# Patient Record
Sex: FEMALE | Race: BLACK OR AFRICAN AMERICAN
[De-identification: names, ages, dates, MRNs, and addresses within clinical notes are randomized per-mention and may not be internally consistent; named-entity substitution may affect disease eponyms.]

---

## 2019-03-12 ENCOUNTER — APPOINTMENT (OUTPATIENT)
Dept: GASTROENTEROLOGY | Facility: CLINIC | Age: 29
End: 2019-03-12
Payer: MEDICAID

## 2019-03-12 VITALS
BODY MASS INDEX: 25.99 KG/M2 | HEIGHT: 65 IN | DIASTOLIC BLOOD PRESSURE: 70 MMHG | SYSTOLIC BLOOD PRESSURE: 120 MMHG | WEIGHT: 156 LBS | HEART RATE: 78 BPM

## 2019-03-12 DIAGNOSIS — Z80.0 FAMILY HISTORY OF MALIGNANT NEOPLASM OF DIGESTIVE ORGANS: ICD-10-CM

## 2019-03-12 DIAGNOSIS — M47.816 SPONDYLOSIS W/OUT MYELOPATHY OR RADICULOPATHY, LUMBAR REGION: ICD-10-CM

## 2019-03-12 DIAGNOSIS — Z78.9 OTHER SPECIFIED HEALTH STATUS: ICD-10-CM

## 2019-03-12 DIAGNOSIS — Z82.49 FAMILY HISTORY OF ISCHEMIC HEART DISEASE AND OTHER DISEASES OF THE CIRCULATORY SYSTEM: ICD-10-CM

## 2019-03-12 PROCEDURE — 99202 OFFICE O/P NEW SF 15 MIN: CPT

## 2019-03-12 PROCEDURE — 82272 OCCULT BLD FECES 1-3 TESTS: CPT

## 2019-03-12 NOTE — HISTORY OF PRESENT ILLNESS
[Heartburn] : denies heartburn [Nausea] : denies nausea [Vomiting] : denies vomiting [Diarrhea] : denies diarrhea [Constipation] : denies constipation [Yellow Skin Or Eyes (Jaundice)] : denies jaundice [Abdominal Pain] : denies abdominal pain [Abdominal Swelling] : denies abdominal swelling [Rectal Pain] : denies rectal pain [None] : had no significant interval events [Wt Gain ___ Lbs] : no recent weight gain [Wt Loss ___ Lbs] : no recent weight loss [GERD] : no gastroesophageal reflux disease [Hiatus Hernia] : no hiatus hernia [Peptic Ulcer Disease] : no peptic ulcer disease [Pancreatitis] : no pancreatitis [Cholelithiasis] : no cholelithiasis [Kidney Stone] : no kidney stone [Inflammatory Bowel Disease] : no inflammatory bowel disease [Irritable Bowel Syndrome] : no irritable bowel syndrome [Diverticulitis] : no diverticulitis [Alcohol Abuse] : no alcohol abuse [Malignancy] : no malignancy [Abdominal Surgery] : no abdominal surgery [Appendectomy] : no appendectomy [Cholecystectomy] : no cholecystectomy [de-identified] : this is the patient's first visit here [de-identified] : Patient presents today for initial evaluation for screening colonoscopy with a very strong family history of colon cancer in first-degree relatives. Her brother was diagnosed at age 32 with colon cancer and is presently live in her father was diagnosed at age 62 and recently  from metastatic colon cancer. She also has second-degree relatives such as aunts and uncles who have had colon cancer. The patient has no lower GI symptoms or complaints and has had no previous colonoscopies.

## 2019-03-12 NOTE — PHYSICAL EXAM
[General Appearance - Alert] : alert [General Appearance - In No Acute Distress] : in no acute distress [Sclera] : the sclera and conjunctiva were normal [PERRL With Normal Accommodation] : pupils were equal in size, round, and reactive to light [Extraocular Movements] : extraocular movements were intact [Outer Ear] : the ears and nose were normal in appearance [Examination Of The Oral Cavity] : the lips and gums were normal [Oropharynx] : the oropharynx was normal [Neck Appearance] : the appearance of the neck was normal [Neck Cervical Mass (___cm)] : no neck mass was observed [Jugular Venous Distention Increased] : there was no jugular-venous distention [Thyroid Diffuse Enlargement] : the thyroid was not enlarged [Thyroid Nodule] : there were no palpable thyroid nodules [Auscultation Breath Sounds / Voice Sounds] : lungs were clear to auscultation bilaterally [Heart Rate And Rhythm] : heart rate was normal and rhythm regular [Heart Sounds] : normal S1 and S2 [Heart Sounds Gallop] : no gallops [Murmurs] : no murmurs [Heart Sounds Pericardial Friction Rub] : no pericardial rub [Bowel Sounds] : normal bowel sounds [Abdomen Soft] : soft [Abdomen Tenderness] : non-tender [] : no hepato-splenomegaly [Abdomen Mass (___ Cm)] : no abdominal mass palpated [Normal Sphincter Tone] : normal sphincter tone [No Rectal Mass] : no rectal mass [No CVA Tenderness] : no ~M costovertebral angle tenderness [Abnormal Walk] : normal gait [Skin Color & Pigmentation] : normal skin color and pigmentation [Skin Turgor] : normal skin turgor [Oriented To Time, Place, And Person] : oriented to person, place, and time [Internal Hemorrhoid] : no internal hemorrhoids [External Hemorrhoid] : no external hemorrhoids [Occult Blood Positive] : stool was negative for occult blood [FreeTextEntry1] : hemoccult negative stool. The exam was chaperoned.

## 2019-03-12 NOTE — ASSESSMENT
[FreeTextEntry1] : Impression: Family history of colon cancer with 2 first-degree relatives with colon cancer including her brother who was diagnosed with colon cancer at age 32. Patient has had no previous screening colonoscopies.\par \par Recommendations: Screening colonoscopy was advised because of the above strong FH of colon cancer. The risks versus benefits of screening colonoscopy, the one in 1000 risk of possible colonic perforation, the risk of possible post biopsy of polypectomy bleeding, the risk of possible respiratory suppression with intravenous sedation, and alternative testing such as virtual colonoscopy, were individually explained to the patient today who appeared to understand all of the above and was agreeable to proceeding with screening colonoscopy. Her ASA classification is 1. She will be prepped with MiraLax and Dulcolax tablets and is medically optimized for the proposed colonoscopy. She appeared to understand all of the above instructions and management plan.

## 2019-05-10 ENCOUNTER — APPOINTMENT (OUTPATIENT)
Dept: GASTROENTEROLOGY | Facility: GI CENTER | Age: 29
End: 2019-05-10
Payer: MEDICAID

## 2019-05-10 ENCOUNTER — OUTPATIENT (OUTPATIENT)
Dept: OUTPATIENT SERVICES | Facility: HOSPITAL | Age: 29
LOS: 1 days | End: 2019-05-10
Payer: MEDICAID

## 2019-05-10 DIAGNOSIS — Z12.11 ENCOUNTER FOR SCREENING FOR MALIGNANT NEOPLASM OF COLON: ICD-10-CM

## 2019-05-10 DIAGNOSIS — Z80.9 FAMILY HISTORY OF MALIGNANT NEOPLASM, UNSPECIFIED: ICD-10-CM

## 2019-05-10 PROCEDURE — G0105: CPT

## 2019-05-10 PROCEDURE — 45378 DIAGNOSTIC COLONOSCOPY: CPT

## 2019-05-10 NOTE — PHYSICAL EXAM
[General Appearance - Alert] : alert [Sclera] : the sclera and conjunctiva were normal [General Appearance - In No Acute Distress] : in no acute distress [PERRL With Normal Accommodation] : pupils were equal in size, round, and reactive to light [Extraocular Movements] : extraocular movements were intact [Outer Ear] : the ears and nose were normal in appearance [Examination Of The Oral Cavity] : the lips and gums were normal [Oropharynx] : the oropharynx was normal [Neck Appearance] : the appearance of the neck was normal [Thyroid Diffuse Enlargement] : the thyroid was not enlarged [Jugular Venous Distention Increased] : there was no jugular-venous distention [Neck Cervical Mass (___cm)] : no neck mass was observed [Auscultation Breath Sounds / Voice Sounds] : lungs were clear to auscultation bilaterally [Thyroid Nodule] : there were no palpable thyroid nodules [Heart Rate And Rhythm] : heart rate was normal and rhythm regular [Heart Sounds Gallop] : no gallops [Heart Sounds] : normal S1 and S2 [Bowel Sounds] : normal bowel sounds [Murmurs] : no murmurs [Heart Sounds Pericardial Friction Rub] : no pericardial rub [] : no hepato-splenomegaly [Abdomen Soft] : soft [Abdomen Tenderness] : non-tender [Abdomen Mass (___ Cm)] : no abdominal mass palpated [No Rectal Mass] : no rectal mass [Normal Sphincter Tone] : normal sphincter tone [No CVA Tenderness] : no ~M costovertebral angle tenderness [Skin Color & Pigmentation] : normal skin color and pigmentation [Abnormal Walk] : normal gait [Skin Turgor] : normal skin turgor [Oriented To Time, Place, And Person] : oriented to person, place, and time [Occult Blood Positive] : stool was negative for occult blood [Internal Hemorrhoid] : no internal hemorrhoids [External Hemorrhoid] : no external hemorrhoids [FreeTextEntry1] : hemoccult negative stool. The exam was chaperoned.

## 2019-05-10 NOTE — PROCEDURE
[Fm Hx of Colon Ca/Polyps] : family history of colon cancer and/or polyps [Procedure Explained] : The procedure was explained [Allergies Reviewed] : allergies reviewed. [Alternatives] : alternatives [Risks] : Risks [Benefits] : benefits [Bleeding] : bleeding risk [Infection] : risk of infection [Consent Obtained] : written consent was obtained prior to the procedure and is detailed in the patient's record [Approved Diet Followed] : the patient avoided solid foods and adhered to the approved diet list for 24 hours prior to the procedure [Patient] : the patient [Bowel Prep Kit] : the patient took the appropriate bowel preparation kit as directed [Cardiac Monitor] : cardiac monitor [Automated Blood Pressure Cuff] : automated blood pressure cuff [Pulse Oximeter] : pulse oximeter [Propofol ___ mg IV] : Propofol [unfilled] ~Umg intravenously [___ L/min Oxygen via NC] : [unfilled] ~Uliters/minute oxygen via nasal cannula [1] : 1 [Time started: ___] : Start Time:  [unfilled] [Sedation Clearance] : the patient was cleared for moderate sedation [Prep Qualtiy: ___] : Prep Quality:  [unfilled] [Time Completed: ___] : Completion Time:  [unfilled] [Withdrawal Time: ___] : Withdrawal Time:  [unfilled] [Left Lateral Decubitus] : The patient was positioned in the left lateral decubitus position [Performed By: ___] : Performed by:  ODESSA [Cecum (Landmarks/Transillum)] : and guided to the cecum which was identified by the anatomic landmarks of the appendiceal orifice and ileocecal valve and by transillumination in the right lower quadrant [No Difficulty] : without difficulty [Single Pass Needed] : after a single pass [Insufflated] : insufflated [Retroflex View] : a retroflex view of the rectum was performed [Normal] : Normal [Hemorrhoids] : hemorrhoids [Tolerated Well] : the patient tolerated the procedure well [Vital Signs Stable] : the vital signs were stable [No Complications] : There were no complications [Abnormal Rectum] : a normal rectum [Patient Rotated Into Alternating Positions] : the patient was not rotated [External Hemorrhoids] : no external hemorrhoids [de-identified] : [facility] [address] [place] [id number] [de-identified] : Internal hemorrhoids noted on retroflexion. [de-identified] : Internal hemorrhoids o/w normal colonoscopy to the cecum.

## 2019-05-10 NOTE — PHYSICAL EXAM
[General Appearance - In No Acute Distress] : in no acute distress [Sclera] : the sclera and conjunctiva were normal [General Appearance - Alert] : alert [Extraocular Movements] : extraocular movements were intact [PERRL With Normal Accommodation] : pupils were equal in size, round, and reactive to light [Outer Ear] : the ears and nose were normal in appearance [Examination Of The Oral Cavity] : the lips and gums were normal [Oropharynx] : the oropharynx was normal [Neck Appearance] : the appearance of the neck was normal [Thyroid Diffuse Enlargement] : the thyroid was not enlarged [Jugular Venous Distention Increased] : there was no jugular-venous distention [Neck Cervical Mass (___cm)] : no neck mass was observed [Thyroid Nodule] : there were no palpable thyroid nodules [Auscultation Breath Sounds / Voice Sounds] : lungs were clear to auscultation bilaterally [Heart Rate And Rhythm] : heart rate was normal and rhythm regular [Heart Sounds Gallop] : no gallops [Heart Sounds] : normal S1 and S2 [Heart Sounds Pericardial Friction Rub] : no pericardial rub [Bowel Sounds] : normal bowel sounds [Murmurs] : no murmurs [Abdomen Tenderness] : non-tender [Abdomen Soft] : soft [] : no hepato-splenomegaly [No Rectal Mass] : no rectal mass [Abdomen Mass (___ Cm)] : no abdominal mass palpated [Normal Sphincter Tone] : normal sphincter tone [No CVA Tenderness] : no ~M costovertebral angle tenderness [Abnormal Walk] : normal gait [Skin Color & Pigmentation] : normal skin color and pigmentation [Oriented To Time, Place, And Person] : oriented to person, place, and time [Skin Turgor] : normal skin turgor [Internal Hemorrhoid] : no internal hemorrhoids [External Hemorrhoid] : no external hemorrhoids [Occult Blood Positive] : stool was negative for occult blood [FreeTextEntry1] : hemoccult negative stool. The exam was chaperoned.

## 2019-05-10 NOTE — PROCEDURE
[Fm Hx of Colon Ca/Polyps] : family history of colon cancer and/or polyps [Allergies Reviewed] : allergies reviewed. [Procedure Explained] : The procedure was explained [Benefits] : benefits [Alternatives] : alternatives [Risks] : Risks [Bleeding] : bleeding risk [Consent Obtained] : written consent was obtained prior to the procedure and is detailed in the patient's record [Infection] : risk of infection [Approved Diet Followed] : the patient avoided solid foods and adhered to the approved diet list for 24 hours prior to the procedure [Patient] : the patient [Bowel Prep Kit] : the patient took the appropriate bowel preparation kit as directed [Cardiac Monitor] : cardiac monitor [Automated Blood Pressure Cuff] : automated blood pressure cuff [Pulse Oximeter] : pulse oximeter [1] : 1 [Propofol ___ mg IV] : Propofol [unfilled] ~Umg intravenously [___ L/min Oxygen via NC] : [unfilled] ~Uliters/minute oxygen via nasal cannula [Time started: ___] : Start Time:  [unfilled] [Sedation Clearance] : the patient was cleared for moderate sedation [Prep Qualtiy: ___] : Prep Quality:  [unfilled] [Withdrawal Time: ___] : Withdrawal Time:  [unfilled] [Time Completed: ___] : Completion Time:  [unfilled] [Performed By: ___] : Performed by:  ODESSA [Left Lateral Decubitus] : The patient was positioned in the left lateral decubitus position [No Difficulty] : without difficulty [Cecum (Landmarks/Transillum)] : and guided to the cecum which was identified by the anatomic landmarks of the appendiceal orifice and ileocecal valve and by transillumination in the right lower quadrant [Single Pass Needed] : after a single pass [Insufflated] : insufflated [Retroflex View] : a retroflex view of the rectum was performed [Normal] : Normal [Hemorrhoids] : hemorrhoids [Tolerated Well] : the patient tolerated the procedure well [Vital Signs Stable] : the vital signs were stable [No Complications] : There were no complications [Abnormal Rectum] : a normal rectum [External Hemorrhoids] : no external hemorrhoids [Patient Rotated Into Alternating Positions] : the patient was not rotated [de-identified] : [facility] [address] DL [id number] [de-identified] : Internal hemorrhoids noted on retroflexion. [de-identified] : Internal hemorrhoids o/w normal colonoscopy to the cecum.

## 2020-02-10 ENCOUNTER — APPOINTMENT (OUTPATIENT)
Dept: GASTROENTEROLOGY | Facility: CLINIC | Age: 30
End: 2020-02-10
Payer: MEDICAID

## 2020-02-10 VITALS
DIASTOLIC BLOOD PRESSURE: 70 MMHG | HEART RATE: 70 BPM | OXYGEN SATURATION: 98 % | RESPIRATION RATE: 15 BRPM | HEIGHT: 65 IN | BODY MASS INDEX: 27.99 KG/M2 | WEIGHT: 168 LBS | SYSTOLIC BLOOD PRESSURE: 110 MMHG

## 2020-02-10 DIAGNOSIS — K92.1 MELENA: ICD-10-CM

## 2020-02-10 PROCEDURE — 99214 OFFICE O/P EST MOD 30 MIN: CPT

## 2020-02-10 PROCEDURE — 82272 OCCULT BLD FECES 1-3 TESTS: CPT

## 2020-02-10 NOTE — PHYSICAL EXAM
[General Appearance - Alert] : alert [Sclera] : the sclera and conjunctiva were normal [General Appearance - In No Acute Distress] : in no acute distress [Extraocular Movements] : extraocular movements were intact [PERRL With Normal Accommodation] : pupils were equal in size, round, and reactive to light [Outer Ear] : the ears and nose were normal in appearance [Examination Of The Oral Cavity] : the lips and gums were normal [Oropharynx] : the oropharynx was normal [Neck Appearance] : the appearance of the neck was normal [Neck Cervical Mass (___cm)] : no neck mass was observed [Jugular Venous Distention Increased] : there was no jugular-venous distention [Thyroid Diffuse Enlargement] : the thyroid was not enlarged [Thyroid Nodule] : there were no palpable thyroid nodules [Auscultation Breath Sounds / Voice Sounds] : lungs were clear to auscultation bilaterally [Heart Rate And Rhythm] : heart rate was normal and rhythm regular [Heart Sounds] : normal S1 and S2 [Heart Sounds Gallop] : no gallops [Murmurs] : no murmurs [Heart Sounds Pericardial Friction Rub] : no pericardial rub [Bowel Sounds] : normal bowel sounds [Abdomen Soft] : soft [Abdomen Tenderness] : non-tender [] : no hepato-splenomegaly [Abdomen Mass (___ Cm)] : no abdominal mass palpated [Normal Sphincter Tone] : normal sphincter tone [No Rectal Mass] : no rectal mass [No CVA Tenderness] : no ~M costovertebral angle tenderness [Skin Color & Pigmentation] : normal skin color and pigmentation [Abnormal Walk] : normal gait [Skin Turgor] : normal skin turgor [Oriented To Time, Place, And Person] : oriented to person, place, and time [External Hemorrhoid] : no external hemorrhoids [Internal Hemorrhoid] : no internal hemorrhoids [Occult Blood Positive] : stool was negative for occult blood [FreeTextEntry1] : hemoccult negative stool. The exam was chaperoned. No blood in the rectal vault

## 2020-02-10 NOTE — ASSESSMENT
[FreeTextEntry1] : Impression: Painless hematochezia likely secondary to internal hemorrhoids seen on recent colonoscopy done in May 2019.\par \par Recommendations: Hydrocortisone 25 mg suppositories one per rectum q.h.s. p.r.n. further internal hemorrhoidal bleeding. She was told to increase her dietary fiber and free water intake and use MiraLax as needed for periods of constipation to avoid further hemorrhoidal bleeding. She is to return in one month's time for followup evaluation and was told that if she continues to have hematochezia despite the above hydrocortisone suppository therapy she will be referred to colorectal surgery for possible hemorrhoidectomy. She appeared to understand all of the above instructions, information, and management plan.\par

## 2020-02-10 NOTE — REVIEW OF SYSTEMS
[As Noted in HPI] : as noted in HPI [Negative] : Heme/Lymph [Abdominal Pain] : no abdominal pain [Diarrhea] : no diarrhea [Vomiting] : no vomiting [Constipation] : no constipation [Heartburn] : no heartburn [Melena] : no melena [FreeTextEntry7] : rectal bleeding

## 2020-02-10 NOTE — HISTORY OF PRESENT ILLNESS
[None] : had no significant interval events [Heartburn] : denies heartburn [Nausea] : denies nausea [Vomiting] : denies vomiting [Diarrhea] : denies diarrhea [Yellow Skin Or Eyes (Jaundice)] : denies jaundice [Abdominal Swelling] : denies abdominal swelling [Abdominal Pain] : denies abdominal pain [Rectal Pain] : denies rectal pain [Constipation] : constipation [Wt Gain ___ Lbs] : no recent weight gain [Wt Loss ___ Lbs] : no recent weight loss [GERD] : no gastroesophageal reflux disease [Hiatus Hernia] : no hiatus hernia [Pancreatitis] : no pancreatitis [Peptic Ulcer Disease] : no peptic ulcer disease [Kidney Stone] : no kidney stone [Cholelithiasis] : no cholelithiasis [Inflammatory Bowel Disease] : no inflammatory bowel disease [Alcohol Abuse] : no alcohol abuse [Irritable Bowel Syndrome] : no irritable bowel syndrome [Diverticulitis] : no diverticulitis [Malignancy] : no malignancy [Abdominal Surgery] : no abdominal surgery [Cholecystectomy] : no cholecystectomy [Appendectomy] : no appendectomy [de-identified] : May 2019 [de-identified] : colonoscopy [de-identified] : Patient presents today for followup evaluation of new onset painless hematochezia with bright red blood filling the toilet bowl.She is status post a negative screening colonoscopy done for family history of colon cancer and colon polyps in May 2019 which showed only internal hemorrhoids. She had no complaints of rectal bleeding in May and internal hemorrhoids were not treated but now she has symptoms that are strongly suggestive of internal hemorrhoidal bleeding. She had noted some mild constipation prior to the onset of the above hematochezia. [de-identified] : hematochezia

## 2020-03-06 ENCOUNTER — APPOINTMENT (OUTPATIENT)
Dept: GASTROENTEROLOGY | Facility: CLINIC | Age: 30
End: 2020-03-06

## 2020-07-23 ENCOUNTER — RX CHANGE (OUTPATIENT)
Age: 30
End: 2020-07-23

## 2020-07-23 ENCOUNTER — APPOINTMENT (OUTPATIENT)
Dept: GASTROENTEROLOGY | Facility: CLINIC | Age: 30
End: 2020-07-23
Payer: MEDICAID

## 2020-07-23 VITALS
DIASTOLIC BLOOD PRESSURE: 83 MMHG | SYSTOLIC BLOOD PRESSURE: 125 MMHG | HEIGHT: 65 IN | TEMPERATURE: 97.9 F | WEIGHT: 170 LBS | BODY MASS INDEX: 28.32 KG/M2 | HEART RATE: 77 BPM

## 2020-07-23 DIAGNOSIS — Z80.0 FAMILY HISTORY OF MALIGNANT NEOPLASM OF DIGESTIVE ORGANS: ICD-10-CM

## 2020-07-23 DIAGNOSIS — K62.5 HEMORRHAGE OF ANUS AND RECTUM: ICD-10-CM

## 2020-07-23 PROCEDURE — 99214 OFFICE O/P EST MOD 30 MIN: CPT

## 2020-07-23 NOTE — HISTORY OF PRESENT ILLNESS
[ER Visit] : was seen in the Emergency Department [Heartburn] : denies heartburn [Nausea] : denies nausea [Vomiting] : denies vomiting [Diarrhea] : denies diarrhea [Yellow Skin Or Eyes (Jaundice)] : denies jaundice [Constipation] : denies constipation [Abdominal Swelling] : denies abdominal swelling [Rectal Pain] : denies rectal pain [Abdominal Pain] : abdominal pain [Wt Gain ___ Lbs] : no recent weight gain [GERD] : no gastroesophageal reflux disease [Wt Loss ___ Lbs] : no recent weight loss [Hiatus Hernia] : no hiatus hernia [Peptic Ulcer Disease] : no peptic ulcer disease [Pancreatitis] : no pancreatitis [Cholelithiasis] : no cholelithiasis [Kidney Stone] : no kidney stone [Inflammatory Bowel Disease] : no inflammatory bowel disease [Irritable Bowel Syndrome] : no irritable bowel syndrome [Diverticulitis] : no diverticulitis [Alcohol Abuse] : no alcohol abuse [Malignancy] : no malignancy [Abdominal Surgery] : no abdominal surgery [Appendectomy] : no appendectomy [Cholecystectomy] : no cholecystectomy [de-identified] : February 2020 [de-identified] : prescribed hydrocortisone suppositories [de-identified] : Patient presents for followup evaluation of chronic left sided abdominal pain status post a recent emergency room visit at [facility] where she had normal lab work and a CT scan that showed epiploic appendagitis of her left colon primarily the descending colon. She was given IV Toradol in the emergency room there and was told to take Motrin at home which she has not yet done. She states that this pain started roughly 1 month ago and has not improved significantly. She continues to have low volume hematochezia and is status post a screening colonoscopy done in May of 2019 for family history of colon cancer which showed internal hemorrhoids. When she was here in February complaining of painless hematochezia it was felt that this was secondary to the above internal hemorrhoids and hydrocortisone 25 mg suppositories were prescribed but never used by the patient. She continues to have painless intermittent low-volume hematochezia with regular bowel movements. [de-identified] : rectal bleeding

## 2020-07-23 NOTE — REVIEW OF SYSTEMS
[As Noted in HPI] : as noted in HPI [Abdominal Pain] : abdominal pain [Negative] : Heme/Lymph [Vomiting] : no vomiting [Constipation] : no constipation [Diarrhea] : no diarrhea [Heartburn] : no heartburn [Melena] : no melena [FreeTextEntry7] : rectal bleeding

## 2020-07-23 NOTE — ASSESSMENT
[FreeTextEntry1] : Impression: Epiploic appendagitis likely responsible for her left lower quadrant pain. Painless hematochezia likely secondary to internal hemorrhoids seen on colonoscopy done in May of 2019 for family history of colon cancer\par \par Recommendations: Ibuprofen 600 mg 3 times a day was prescribed for the above epiploic appendagitis along with omeprazole 40 mg daily for mucosal cytoprotection while taking ibuprofen. Hydrocortisone 25 mg suppositories one per rectum q.h.s. p.r.n. internal hemorrhoidal bleeding was also prescribed. She is to return here in 3 weeks' time for followup evaluation. Repeat colonoscopy in 2024 or 5 years from her last colonoscopy in 2019 for family history of colon cancer.

## 2020-07-23 NOTE — PHYSICAL EXAM
[General Appearance - Alert] : alert [General Appearance - In No Acute Distress] : in no acute distress [Sclera] : the sclera and conjunctiva were normal [Extraocular Movements] : extraocular movements were intact [PERRL With Normal Accommodation] : pupils were equal in size, round, and reactive to light [Outer Ear] : the ears and nose were normal in appearance [Oropharynx] : the oropharynx was normal [Examination Of The Oral Cavity] : the lips and gums were normal [Neck Cervical Mass (___cm)] : no neck mass was observed [Neck Appearance] : the appearance of the neck was normal [Jugular Venous Distention Increased] : there was no jugular-venous distention [Thyroid Diffuse Enlargement] : the thyroid was not enlarged [Thyroid Nodule] : there were no palpable thyroid nodules [Heart Sounds] : normal S1 and S2 [Heart Sounds Gallop] : no gallops [Auscultation Breath Sounds / Voice Sounds] : lungs were clear to auscultation bilaterally [Heart Rate And Rhythm] : heart rate was normal and rhythm regular [Heart Sounds Pericardial Friction Rub] : no pericardial rub [Murmurs] : no murmurs [Abdomen Soft] : soft [Bowel Sounds] : normal bowel sounds [Abdomen Mass (___ Cm)] : no abdominal mass palpated [] : no hepato-splenomegaly [LLQ] : in the left lower quadrant [No CVA Tenderness] : no ~M costovertebral angle tenderness [Skin Color & Pigmentation] : normal skin color and pigmentation [Abnormal Walk] : normal gait [Oriented To Time, Place, And Person] : oriented to person, place, and time [Skin Turgor] : normal skin turgor [Epigastric] : not in the epigastric area [Periumbilical] : not in the periumbilical area [Suprapubic] : not in the suprapubic area [RUQ] : not in the in the right upper quadrant [RLQ] : not in the right lower quadrant [LUQ] : not in the left upper quadrant [Internal Hemorrhoid] : no internal hemorrhoids [External Hemorrhoid] : no external hemorrhoids [Occult Blood Positive] : stool was negative for occult blood [FreeTextEntry1] : not done today

## 2020-07-24 ENCOUNTER — RX CHANGE (OUTPATIENT)
Age: 30
End: 2020-07-24

## 2020-08-11 ENCOUNTER — APPOINTMENT (OUTPATIENT)
Dept: CT IMAGING | Facility: CLINIC | Age: 30
End: 2020-08-11
Payer: MEDICAID

## 2020-08-11 ENCOUNTER — OUTPATIENT (OUTPATIENT)
Dept: OUTPATIENT SERVICES | Facility: HOSPITAL | Age: 30
LOS: 1 days | End: 2020-08-11
Payer: MEDICAID

## 2020-08-11 DIAGNOSIS — Z00.00 ENCOUNTER FOR GENERAL ADULT MEDICAL EXAMINATION WITHOUT ABNORMAL FINDINGS: ICD-10-CM

## 2020-08-11 DIAGNOSIS — K52.9 NONINFECTIVE GASTROENTERITIS AND COLITIS, UNSPECIFIED: ICD-10-CM

## 2020-08-11 PROCEDURE — 74177 CT ABD & PELVIS W/CONTRAST: CPT | Mod: 26

## 2020-08-11 PROCEDURE — 74177 CT ABD & PELVIS W/CONTRAST: CPT

## 2020-08-17 ENCOUNTER — TRANSCRIPTION ENCOUNTER (OUTPATIENT)
Age: 30
End: 2020-08-17

## 2020-08-18 ENCOUNTER — APPOINTMENT (OUTPATIENT)
Dept: GASTROENTEROLOGY | Facility: CLINIC | Age: 30
End: 2020-08-18
Payer: MEDICAID

## 2020-08-18 VITALS
HEIGHT: 65 IN | HEART RATE: 76 BPM | TEMPERATURE: 99.1 F | WEIGHT: 170 LBS | SYSTOLIC BLOOD PRESSURE: 120 MMHG | BODY MASS INDEX: 28.32 KG/M2 | DIASTOLIC BLOOD PRESSURE: 75 MMHG

## 2020-08-18 DIAGNOSIS — R10.9 UNSPECIFIED ABDOMINAL PAIN: ICD-10-CM

## 2020-08-18 DIAGNOSIS — K52.9 NONINFECTIVE GASTROENTERITIS AND COLITIS, UNSPECIFIED: ICD-10-CM

## 2020-08-18 PROCEDURE — 99214 OFFICE O/P EST MOD 30 MIN: CPT

## 2020-08-18 NOTE — PHYSICAL EXAM
[General Appearance - Alert] : alert [General Appearance - In No Acute Distress] : in no acute distress [Sclera] : the sclera and conjunctiva were normal [PERRL With Normal Accommodation] : pupils were equal in size, round, and reactive to light [Extraocular Movements] : extraocular movements were intact [Outer Ear] : the ears and nose were normal in appearance [Oropharynx] : the oropharynx was normal [Examination Of The Oral Cavity] : the lips and gums were normal [Neck Appearance] : the appearance of the neck was normal [Neck Cervical Mass (___cm)] : no neck mass was observed [Jugular Venous Distention Increased] : there was no jugular-venous distention [Thyroid Diffuse Enlargement] : the thyroid was not enlarged [Thyroid Nodule] : there were no palpable thyroid nodules [Heart Rate And Rhythm] : heart rate was normal and rhythm regular [Auscultation Breath Sounds / Voice Sounds] : lungs were clear to auscultation bilaterally [Murmurs] : no murmurs [Heart Sounds] : normal S1 and S2 [Heart Sounds Gallop] : no gallops [Bowel Sounds] : normal bowel sounds [Abdomen Soft] : soft [Heart Sounds Pericardial Friction Rub] : no pericardial rub [Abdomen Mass (___ Cm)] : no abdominal mass palpated [] : no hepato-splenomegaly [LLQ] : in the left lower quadrant [No CVA Tenderness] : no ~M costovertebral angle tenderness [Abnormal Walk] : normal gait [Skin Turgor] : normal skin turgor [Skin Color & Pigmentation] : normal skin color and pigmentation [Oriented To Time, Place, And Person] : oriented to person, place, and time [Epigastric] : not in the epigastric area [Periumbilical] : not in the periumbilical area [RUQ] : not in the in the right upper quadrant [Suprapubic] : not in the suprapubic area [LUQ] : not in the left upper quadrant [RLQ] : not in the right lower quadrant [External Hemorrhoid] : no external hemorrhoids [Internal Hemorrhoid] : no internal hemorrhoids [Occult Blood Positive] : stool was negative for occult blood [FreeTextEntry1] : not done today

## 2020-08-18 NOTE — HISTORY OF PRESENT ILLNESS
[None] : had no significant interval events [Heartburn] : denies heartburn [Vomiting] : denies vomiting [Nausea] : denies nausea [Diarrhea] : denies diarrhea [Yellow Skin Or Eyes (Jaundice)] : denies jaundice [Constipation] : denies constipation [Abdominal Pain] : abdominal pain worsened [Abdominal Swelling] : denies abdominal swelling [Rectal Pain] : denies rectal pain [Hiatus Hernia] : no hiatus hernia [Peptic Ulcer Disease] : no peptic ulcer disease [GERD] : no gastroesophageal reflux disease [Pancreatitis] : no pancreatitis [Cholelithiasis] : no cholelithiasis [Kidney Stone] : no kidney stone [Inflammatory Bowel Disease] : no inflammatory bowel disease [Diverticulitis] : no diverticulitis [Irritable Bowel Syndrome] : no irritable bowel syndrome [Malignancy] : no malignancy [Alcohol Abuse] : no alcohol abuse [Abdominal Surgery] : no abdominal surgery [Appendectomy] : no appendectomy [Cholecystectomy] : no cholecystectomy [de-identified] : July 23, 2020 [de-identified] : ordering repeat CAT scan [de-identified] : Patient presents for followup evaluation of chronic left lower quadrant pain 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 The first CAT scan was done in July 2 one was done a couple of weeks ago. She has been taking ibuprofen and with no symptomatic relief of her left lower quadrant pain. She is status post a negative colonoscopy done in May 2019 for positive family history of colon cancer and states that with this left lower quadrant pain she has also been having intermittent diarrhea with mucus but no blood in the stool.

## 2020-08-18 NOTE — ASSESSMENT
[FreeTextEntry1] : Impression: Chronic left lower quadrant pain with CT scan showing persistent epiploic appendigitis which has been present since July of this year but no symptomatic improvement with anti-inflammatory medications and now also with associated diarrhea rule out possible inflammatory bowel disease and/or other types of chronic colitis.\par \par Recommendations: Repeat colonoscopy was advised for further evaluation of the above. The risks versus benefits of repeat colonoscopy and intravenous sedation, and alternative testing such as virtual colonoscopy, were individually explained to the patient today who appeared to understand all of the above and was agreeable to proceeding with repeat colonoscopy. Her ASA classification is 1. She will be prepped with MiraLax and Dulcolax tablets and is medically optimized for the proposed colonoscopy and appeared to understand all of the above instructions, information, and management plan.

## 2020-08-20 ENCOUNTER — TRANSCRIPTION ENCOUNTER (OUTPATIENT)
Age: 30
End: 2020-08-20

## 2020-08-26 ENCOUNTER — TRANSCRIPTION ENCOUNTER (OUTPATIENT)
Age: 30
End: 2020-08-26

## 2020-08-26 DIAGNOSIS — Z01.818 ENCOUNTER FOR OTHER PREPROCEDURAL EXAMINATION: ICD-10-CM

## 2020-08-27 ENCOUNTER — APPOINTMENT (OUTPATIENT)
Dept: NEUROLOGY | Facility: CLINIC | Age: 30
End: 2020-08-27
Payer: MEDICAID

## 2020-08-27 VITALS
WEIGHT: 168 LBS | TEMPERATURE: 98.1 F | SYSTOLIC BLOOD PRESSURE: 120 MMHG | BODY MASS INDEX: 27.99 KG/M2 | HEIGHT: 65 IN | DIASTOLIC BLOOD PRESSURE: 80 MMHG

## 2020-08-27 PROCEDURE — 99204 OFFICE O/P NEW MOD 45 MIN: CPT

## 2020-08-27 RX ORDER — OMEPRAZOLE 40 MG/1
40 CAPSULE, DELAYED RELEASE ORAL
Qty: 30 | Refills: 5 | Status: DISCONTINUED | COMMUNITY
Start: 2020-07-23 | End: 2020-08-27

## 2020-08-27 RX ORDER — CYCLOBENZAPRINE HYDROCHLORIDE 10 MG/1
10 TABLET, FILM COATED ORAL
Refills: 0 | Status: DISCONTINUED | COMMUNITY
End: 2020-08-27

## 2020-08-27 RX ORDER — IBUPROFEN 600 MG/1
600 TABLET, FILM COATED ORAL 3 TIMES DAILY
Qty: 90 | Refills: 0 | Status: DISCONTINUED | COMMUNITY
Start: 1900-01-01 | End: 2020-08-27

## 2020-08-27 RX ORDER — MELOXICAM 15 MG/1
15 TABLET ORAL
Refills: 0 | Status: DISCONTINUED | COMMUNITY
End: 2020-08-27

## 2020-08-27 RX ORDER — METHOCARBAMOL 500 MG/1
500 TABLET, FILM COATED ORAL
Refills: 0 | Status: DISCONTINUED | COMMUNITY
End: 2020-08-27

## 2020-08-27 RX ORDER — HYDROCORTISONE ACETATE 25 MG/1
25 SUPPOSITORY RECTAL
Qty: 12 | Refills: 5 | Status: DISCONTINUED | COMMUNITY
Start: 2020-02-10 | End: 2020-08-27

## 2020-08-27 NOTE — HISTORY OF PRESENT ILLNESS
[FreeTextEntry1] : I saw this patient in the office today.\par \par She has been seen in  gastroenterologist for left lower quadrant abdominal pain radiating to her inguinal region.\par \par In June of 2020 she began to notice pains radiating from her left buttock down the left leg into the foot.\par She also continues to have pains radiating to the inguinal region.\par These are with her on a daily basis.\par They wax and wane in intensity.\par She denies associated sensory loss although she has some limitation with movement due to the pain.\par \par \par

## 2020-08-27 NOTE — CONSULT LETTER
[Dear  ___] : Dear  [unfilled], [Consult Closing:] : Thank you very much for allowing me to participate in the care of this patient.  If you have any questions, please do not hesitate to contact me. [Courtesy Letter:] : I had the pleasure of seeing your patient, [unfilled], in my office today. [Please see my note below.] : Please see my note below. [Sincerely,] : Sincerely, [FreeTextEntry3] : Daryn Sebastian MD.

## 2020-08-27 NOTE — ASSESSMENT
[FreeTextEntry1] : This is a 29-year-old woman with pain radiating down the left leg into the foot.\par I will obtain an MRI of the lumbar spine.\par I will obtain EMG and nerve conduction studies of the left lower extremity.\par I have recommended she begin physical therapy. She reports that you have already given her a referral.\par \par I will see her back after the above testing.

## 2020-08-28 ENCOUNTER — APPOINTMENT (OUTPATIENT)
Dept: DISASTER EMERGENCY | Facility: CLINIC | Age: 30
End: 2020-08-28

## 2020-08-29 LAB — SARS-COV-2 N GENE NPH QL NAA+PROBE: NOT DETECTED

## 2020-08-31 ENCOUNTER — OUTPATIENT (OUTPATIENT)
Dept: OUTPATIENT SERVICES | Facility: HOSPITAL | Age: 30
LOS: 1 days | End: 2020-08-31
Payer: MEDICAID

## 2020-08-31 ENCOUNTER — RESULT REVIEW (OUTPATIENT)
Age: 30
End: 2020-08-31

## 2020-08-31 ENCOUNTER — APPOINTMENT (OUTPATIENT)
Dept: GASTROENTEROLOGY | Facility: GI CENTER | Age: 30
End: 2020-08-31
Payer: MEDICAID

## 2020-08-31 DIAGNOSIS — Z80.9 FAMILY HISTORY OF MALIGNANT NEOPLASM, UNSPECIFIED: ICD-10-CM

## 2020-08-31 DIAGNOSIS — K64.8 OTHER HEMORRHOIDS: ICD-10-CM

## 2020-08-31 DIAGNOSIS — K52.9 NONINFECTIVE GASTROENTERITIS AND COLITIS, UNSPECIFIED: ICD-10-CM

## 2020-08-31 DIAGNOSIS — R19.7 DIARRHEA, UNSPECIFIED: ICD-10-CM

## 2020-08-31 DIAGNOSIS — R10.32 LEFT LOWER QUADRANT PAIN: ICD-10-CM

## 2020-08-31 PROCEDURE — 45380 COLONOSCOPY AND BIOPSY: CPT

## 2020-08-31 PROCEDURE — 88305 TISSUE EXAM BY PATHOLOGIST: CPT

## 2020-08-31 PROCEDURE — 88305 TISSUE EXAM BY PATHOLOGIST: CPT | Mod: 26

## 2020-08-31 NOTE — PHYSICAL EXAM
[General Appearance - Alert] : alert [General Appearance - In No Acute Distress] : in no acute distress [PERRL With Normal Accommodation] : pupils were equal in size, round, and reactive to light [Sclera] : the sclera and conjunctiva were normal [Extraocular Movements] : extraocular movements were intact [Outer Ear] : the ears and nose were normal in appearance [Oropharynx] : the oropharynx was normal [Examination Of The Oral Cavity] : the lips and gums were normal [Neck Appearance] : the appearance of the neck was normal [Jugular Venous Distention Increased] : there was no jugular-venous distention [Neck Cervical Mass (___cm)] : no neck mass was observed [Thyroid Nodule] : there were no palpable thyroid nodules [Thyroid Diffuse Enlargement] : the thyroid was not enlarged [Auscultation Breath Sounds / Voice Sounds] : lungs were clear to auscultation bilaterally [Heart Sounds] : normal S1 and S2 [Heart Rate And Rhythm] : heart rate was normal and rhythm regular [Heart Sounds Gallop] : no gallops [Murmurs] : no murmurs [Heart Sounds Pericardial Friction Rub] : no pericardial rub [Bowel Sounds] : normal bowel sounds [] : no hepato-splenomegaly [Abdomen Soft] : soft [Abdomen Mass (___ Cm)] : no abdominal mass palpated [LLQ] : in the left lower quadrant [No CVA Tenderness] : no ~M costovertebral angle tenderness [Abnormal Walk] : normal gait [Skin Color & Pigmentation] : normal skin color and pigmentation [Skin Turgor] : normal skin turgor [Oriented To Time, Place, And Person] : oriented to person, place, and time [Epigastric] : not in the epigastric area [Periumbilical] : not in the periumbilical area [Suprapubic] : not in the suprapubic area [RUQ] : not in the in the right upper quadrant [RLQ] : not in the right lower quadrant [LUQ] : not in the left upper quadrant [Internal Hemorrhoid] : no internal hemorrhoids [External Hemorrhoid] : no external hemorrhoids [Occult Blood Positive] : stool was negative for occult blood [FreeTextEntry1] : not done today

## 2020-08-31 NOTE — PROCEDURE
[With Biopsy] : with biopsy [Chronic Diarrhea] : chronic diarrhea [Abdominal Pain] : abdominal pain [Procedure Explained] : The procedure was explained [Allergies Reviewed] : allergies reviewed. [Risks] : Risks [Benefits] : benefits [Alternatives] : alternatives [Bleeding] : bleeding risk [Infection] : risk of infection [Consent Obtained] : written consent was obtained prior to the procedure and is detailed in the patient's record [Patient] : the patient [Bowel Prep Kit] : the patient took the appropriate bowel preparation kit as directed [Approved Diet Followed] : the patient avoided solid foods and adhered to the approved diet list for 24 hours prior to the procedure [Automated Blood Pressure Cuff] : automated blood pressure cuff [Cardiac Monitor] : cardiac monitor [Pulse Oximeter] : pulse oximeter [Propofol ___ mg IV] : Propofol [unfilled] ~Umg intravenously [___ L/min Oxygen via NC] : [unfilled] ~Uliters/minute oxygen via nasal cannula [2] : 2 [Sedation Clearance] : the patient was cleared for moderate sedation [Time started: ___] : Start Time:  [unfilled] [Prep Qualtiy: ___] : Prep Quality:  [unfilled] [Withdrawal Time: ___] : Withdrawal Time:  [unfilled] [Time Completed: ___] : Completion Time:  [unfilled] [Performed By: ___] : Performed by:  ODESSA [Left Lateral Decubitus] : The patient was positioned in the left lateral decubitus position [Cecum (Landmarks/Transillum)] : and guided to the cecum which was identified by the anatomic landmarks of the appendiceal orifice and ileocecal valve and by transillumination in the right lower quadrant [Terminal Ileum via Ileocecal Valve] : and the terminal ileum was examined by entering the ileocecal valve [No Difficulty] : without difficulty [Insufflated] : insufflated [Single Pass Needed] : after a single pass [Retroflex View] : a retroflex view of the rectum was performed [Normal] : Normal [Hemorrhoids] : hemorrhoids [Biopsy] : biopsy [Sent to Pathology] : was sent to pathology for analysis [Tolerated Well] : the patient tolerated the procedure well [Vital Signs Stable] : the vital signs were stable [No Complications] : There were no complications [Abnormal Rectum] : a normal rectum [External Hemorrhoids] : no external hemorrhoids [Patient Rotated Into Alternating Positions] : the patient was not rotated [de-identified] : [facility] [address] [place] [id number] [de-identified] : terminal ileum was entered and found to be normal w/o ileitis. [de-identified] : Biopsies taken to r/o microscopic colitis. [de-identified] : See above. [de-identified] : See above. [de-identified] : See above. [de-identified] : See above. [de-identified] : See above. [de-identified] : Internal hemorrhoids o/w normal colonoscopy to the cecum and into the terminal ileum. No visible ileocolitis. Biopsies taken throughout the colon to r/o microscopic colitis.

## 2020-08-31 NOTE — PROCEDURE
[With Biopsy] : with biopsy [Chronic Diarrhea] : chronic diarrhea [Abdominal Pain] : abdominal pain [Procedure Explained] : The procedure was explained [Allergies Reviewed] : allergies reviewed. [Benefits] : benefits [Risks] : Risks [Alternatives] : alternatives [Bleeding] : bleeding risk [Infection] : risk of infection [Consent Obtained] : written consent was obtained prior to the procedure and is detailed in the patient's record [Patient] : the patient [Bowel Prep Kit] : the patient took the appropriate bowel preparation kit as directed [Approved Diet Followed] : the patient avoided solid foods and adhered to the approved diet list for 24 hours prior to the procedure [Automated Blood Pressure Cuff] : automated blood pressure cuff [Cardiac Monitor] : cardiac monitor [Pulse Oximeter] : pulse oximeter [Propofol ___ mg IV] : Propofol [unfilled] ~Umg intravenously [___ L/min Oxygen via NC] : [unfilled] ~Uliters/minute oxygen via nasal cannula [2] : 2 [Sedation Clearance] : the patient was cleared for moderate sedation [Time started: ___] : Start Time:  [unfilled] [Prep Qualtiy: ___] : Prep Quality:  [unfilled] [Withdrawal Time: ___] : Withdrawal Time:  [unfilled] [Time Completed: ___] : Completion Time:  [unfilled] [Performed By: ___] : Performed by:  ODESSA [Left Lateral Decubitus] : The patient was positioned in the left lateral decubitus position [Cecum (Landmarks/Transillum)] : and guided to the cecum which was identified by the anatomic landmarks of the appendiceal orifice and ileocecal valve and by transillumination in the right lower quadrant [Terminal Ileum via Ileocecal Valve] : and the terminal ileum was examined by entering the ileocecal valve [No Difficulty] : without difficulty [Insufflated] : insufflated [Single Pass Needed] : after a single pass [Retroflex View] : a retroflex view of the rectum was performed [Normal] : Normal [Hemorrhoids] : hemorrhoids [Biopsy] : biopsy [Sent to Pathology] : was sent to pathology for analysis [Tolerated Well] : the patient tolerated the procedure well [Vital Signs Stable] : the vital signs were stable [No Complications] : There were no complications [Abnormal Rectum] : a normal rectum [External Hemorrhoids] : no external hemorrhoids [Patient Rotated Into Alternating Positions] : the patient was not rotated [de-identified] : [facility] [address] [place] [id number] [de-identified] : terminal ileum was entered and found to be normal w/o ileitis. [de-identified] : Biopsies taken to r/o microscopic colitis. [de-identified] : See above. [de-identified] : See above. [de-identified] : See above. [de-identified] : See above. [de-identified] : See above. [de-identified] : Internal hemorrhoids o/w normal colonoscopy to the cecum and into the terminal ileum. No visible ileocolitis. Biopsies taken throughout the colon to r/o microscopic colitis.

## 2020-08-31 NOTE — REASON FOR VISIT
[Procedure: _________] : a [unfilled] procedure visit [Colonoscopy] : a colonoscopy [FreeTextEntry2] : Pt. is here for colonoscopy for chronic LLQ pain and diarrhea and FH of colon cancer

## 2020-08-31 NOTE — REVIEW OF SYSTEMS
[Abdominal Pain] : abdominal pain [Diarrhea] : diarrhea [Negative] : Heme/Lymph [Vomiting] : no vomiting [Constipation] : no constipation [Heartburn] : no heartburn [Melena] : no melena

## 2020-08-31 NOTE — PHYSICAL EXAM
[General Appearance - Alert] : alert [General Appearance - In No Acute Distress] : in no acute distress [PERRL With Normal Accommodation] : pupils were equal in size, round, and reactive to light [Sclera] : the sclera and conjunctiva were normal [Extraocular Movements] : extraocular movements were intact [Outer Ear] : the ears and nose were normal in appearance [Examination Of The Oral Cavity] : the lips and gums were normal [Oropharynx] : the oropharynx was normal [Neck Appearance] : the appearance of the neck was normal [Neck Cervical Mass (___cm)] : no neck mass was observed [Jugular Venous Distention Increased] : there was no jugular-venous distention [Thyroid Nodule] : there were no palpable thyroid nodules [Thyroid Diffuse Enlargement] : the thyroid was not enlarged [Auscultation Breath Sounds / Voice Sounds] : lungs were clear to auscultation bilaterally [Heart Sounds] : normal S1 and S2 [Heart Rate And Rhythm] : heart rate was normal and rhythm regular [Heart Sounds Gallop] : no gallops [Murmurs] : no murmurs [Heart Sounds Pericardial Friction Rub] : no pericardial rub [Bowel Sounds] : normal bowel sounds [] : no hepato-splenomegaly [Abdomen Soft] : soft [Abdomen Mass (___ Cm)] : no abdominal mass palpated [LLQ] : in the left lower quadrant [No CVA Tenderness] : no ~M costovertebral angle tenderness [Abnormal Walk] : normal gait [Skin Color & Pigmentation] : normal skin color and pigmentation [Skin Turgor] : normal skin turgor [Oriented To Time, Place, And Person] : oriented to person, place, and time [Epigastric] : not in the epigastric area [Periumbilical] : not in the periumbilical area [Suprapubic] : not in the suprapubic area [RUQ] : not in the in the right upper quadrant [RLQ] : not in the right lower quadrant [LUQ] : not in the left upper quadrant [Internal Hemorrhoid] : no internal hemorrhoids [External Hemorrhoid] : no external hemorrhoids [Occult Blood Positive] : stool was negative for occult blood [FreeTextEntry1] : not done today

## 2020-09-01 ENCOUNTER — APPOINTMENT (OUTPATIENT)
Dept: MRI IMAGING | Facility: CLINIC | Age: 30
End: 2020-09-01

## 2020-09-01 ENCOUNTER — OUTPATIENT (OUTPATIENT)
Dept: OUTPATIENT SERVICES | Facility: HOSPITAL | Age: 30
LOS: 1 days | End: 2020-09-01
Payer: MEDICAID

## 2020-09-01 DIAGNOSIS — M54.10 RADICULOPATHY, SITE UNSPECIFIED: ICD-10-CM

## 2020-09-01 PROCEDURE — 72148 MRI LUMBAR SPINE W/O DYE: CPT | Mod: 26

## 2020-09-01 PROCEDURE — 72148 MRI LUMBAR SPINE W/O DYE: CPT

## 2020-09-03 LAB — SURGICAL PATHOLOGY STUDY: SIGNIFICANT CHANGE UP

## 2020-09-18 ENCOUNTER — APPOINTMENT (OUTPATIENT)
Dept: NEUROLOGY | Facility: CLINIC | Age: 30
End: 2020-09-18
Payer: MEDICAID

## 2020-09-18 PROCEDURE — 95909 NRV CNDJ TST 5-6 STUDIES: CPT

## 2020-09-18 PROCEDURE — 95886 MUSC TEST DONE W/N TEST COMP: CPT

## 2020-09-22 ENCOUNTER — TRANSCRIPTION ENCOUNTER (OUTPATIENT)
Age: 30
End: 2020-09-22

## 2020-11-18 ENCOUNTER — TRANSCRIPTION ENCOUNTER (OUTPATIENT)
Age: 30
End: 2020-11-18

## 2020-11-19 ENCOUNTER — APPOINTMENT (OUTPATIENT)
Dept: NEUROLOGY | Facility: CLINIC | Age: 30
End: 2020-11-19
Payer: MEDICAID

## 2020-11-19 VITALS — DIASTOLIC BLOOD PRESSURE: 70 MMHG | SYSTOLIC BLOOD PRESSURE: 120 MMHG | HEIGHT: 65 IN | TEMPERATURE: 97.3 F

## 2020-11-19 PROCEDURE — 99213 OFFICE O/P EST LOW 20 MIN: CPT

## 2020-11-19 NOTE — DATA REVIEWED
[de-identified] : MRI of the lumbar spine was performed on 9/1/2020.\par The study was normal.\par There were no herniated discs.\par \par EMG of both lower extremities was normal.\par

## 2020-11-19 NOTE — HISTORY OF PRESENT ILLNESS
[FreeTextEntry1] : I saw this patient in the office today.\par \par As you recall, she has been seeing a  gastroenterologist for left lower quadrant abdominal pain radiating to her inguinal region.\par \par In June of 2020 she began to notice pains radiating from her left buttock down the left leg into the foot.\par She also continued to have pains radiating to the inguinal region.\par These are with her on a daily basis.\par They wax and wane in intensity.\par She denied associated sensory loss although she has some limitation with movement due to the pain.\par \par \par

## 2020-11-19 NOTE — PHYSICAL EXAM
[General Appearance - Alert] : alert [General Appearance - In No Acute Distress] : in no acute distress [Oriented To Time, Place, And Person] : oriented to person, place, and time [Affect] : the affect was normal [Memory Recent] : recent memory was not impaired [Memory Remote] : remote memory was not impaired [Current Events] : adequate knowledge of current events [Vocabulary] : adequate range of vocabulary [Cranial Nerves Optic (II)] : visual acuity intact bilaterally,  visual fields full to confrontation, pupils equal round and reactive to light [Cranial Nerves Oculomotor (III)] : extraocular motion intact [Cranial Nerves Trigeminal (V)] : facial sensation intact symmetrically [Cranial Nerves Facial (VII)] : face symmetrical [Cranial Nerves Vestibulocochlear (VIII)] : hearing was intact bilaterally [Cranial Nerves Glossopharyngeal (IX)] : tongue and palate midline [Cranial Nerves Accessory (XI - Cranial And Spinal)] : head turning and shoulder shrug symmetric [Cranial Nerves Hypoglossal (XII)] : there was no tongue deviation with protrusion [Motor Tone] : muscle tone was normal in all four extremities [Motor Strength] : muscle strength was normal in all four extremities [Sensation Tactile Decrease] : light touch was intact [Sensation Pain / Temperature Decrease] : pain and temperature was intact [Sensation Vibration Decrease] : vibration was intact [Abnormal Walk] : normal gait [2+] : Patella left 2+ [Optic Disc Abnormality] : the optic disc were normal in size and color [Edema] : there was no peripheral edema [Involuntary Movements] : no involuntary movements were seen [Dysarthria] : no dysarthria [Aphasia] : no dysphasia/aphasia [Romberg's Sign] : Romberg's sign was negtive [Coordination - Dysmetria Impaired Finger-to-Nose Bilateral] : not present [Plantar Reflex Right Only] : normal on the right [Plantar Reflex Left Only] : normal on the left

## 2020-11-19 NOTE — CONSULT LETTER
[Dear  ___] : Dear  [unfilled], [Courtesy Letter:] : I had the pleasure of seeing your patient, [unfilled], in my office today. [Please see my note below.] : Please see my note below. [Consult Closing:] : Thank you very much for allowing me to participate in the care of this patient.  If you have any questions, please do not hesitate to contact me. [Sincerely,] : Sincerely, [FreeTextEntry3] : Daryn Sebastian MD.

## 2020-11-19 NOTE — ASSESSMENT
[FreeTextEntry1] : This is a 30 year-old woman with pain radiating down the left leg into the foot.\par \par Lumbar MRI and EMG were totally normal.\par I would recommend that you evaluate her for musculoskeletal causes.\par \par I would be happy to see her as needed.

## 2020-12-02 ENCOUNTER — APPOINTMENT (OUTPATIENT)
Dept: ORTHOPEDIC SURGERY | Facility: CLINIC | Age: 30
End: 2020-12-02
Payer: MEDICAID

## 2020-12-02 VITALS
BODY MASS INDEX: 29.66 KG/M2 | HEART RATE: 81 BPM | HEIGHT: 65 IN | WEIGHT: 178 LBS | DIASTOLIC BLOOD PRESSURE: 78 MMHG | SYSTOLIC BLOOD PRESSURE: 112 MMHG

## 2020-12-02 PROCEDURE — 99203 OFFICE O/P NEW LOW 30 MIN: CPT

## 2020-12-02 PROCEDURE — 99072 ADDL SUPL MATRL&STAF TM PHE: CPT

## 2020-12-02 NOTE — PHYSICAL EXAM
[de-identified] : General:\par Awake, alert, no acute distress, Patient was cooperative and appropriate during the examination.\par \par The patient's weight is overweight for height and age.\par \par Walks with a mildly antalgic gait on the left side.\par \par Full, painless range of motion of the neck and back.\par \par Exam of the bilateral lower extremities is intact and symmetric with regards to dermatologic, vascular, and neurologic exam. Bilateral lower extremity sensation is grossly intact to light touch in the DP/SP/T/S/S nerve distributions. Intact DF/PF/EHL. BIlateral lower extremity warm and well-perfused with brisk capillary refill.\par \par Pulmonary:\par Regular, nonlabored breathing\par \par Abdomen:\par Soft, nontender, nondistended.\par \par Lymphatic:\par No evidence of inguinal lymphadenopathy\par \par \par \par Left hip Examination:\par Physical examination of the hip demonstrates normal skin without signs of skin changes or abnormalities. No erythema, warmth, or joint effusion is appreciated.\par \par Sensation is intact to light touch L2-S1\par Palpable DP/PT pulse\par EHL/FHL/TA/GSC motor function intact\par \par Range of Motion\par 105 degrees of flexion to full extension\par 40 degrees of internal rotation\par 70 degrees of external rotation\par \par Strength Testing\par Hip Flexors/Hip Extensors 5/5\par Hip Abductors/Hip Adductors 5/5\par Quadriceps/Hamstring 5/5\par Patient is able to perform a straight leg raise without difficulty.\par \par Palpation\par Mildly tender to palpation about the pubic symphysis\par Moderately tender to palpation about the rectus insertion/conjoined tendon insertion\par Exquisitely tender to palpation about the adductor longus tendon on the left, less pain on the right\par \par Special Tests\par LISANDRO test negative\par FADIR test negative\par Celso test negative\par Resisted sit-ups mild discomfort\par Pain with valsalva mild discomfort\par \par \par \par \par \par  [de-identified] : X-rays including 2 views of the left hip from  radiology were reviewed the patient today in the office.  There is no acute fracture or dislocation.  No arthritis.  Pubic symphysis appears normal.\par \par MRI of the lumbar spine is also reviewed the patient today.  There is no acute pathology.  There is a normal appearance of the conus medullaris and cauda equina nerve roots.  Imaging is from 9/2/2020.

## 2020-12-02 NOTE — DISCUSSION/SUMMARY
[de-identified] : Assessment: 30-year-old female with left groin pain secondary to possible core muscle injury as well as possible sciatica\par \par Plan: I had a long discussion with the patient today regarding the nature of their diagnosis and treatment plan.  We discussed the risks and benefits of no treatment as well as nonoperative and operative treatments.  I reviewed the patient's x-rays today with her in the office which are negative for any acute pathology.  Based on the patient's clinical exam she does have signs and symptoms consistent with a core muscle injury/athletic pubalgia.  She also has signs and symptoms consistent with radiculopathy down the back of her left leg.  Patient has had conservative treatment for the past several months with anti-inflammatories, heating, icing, activity modifications and home exercises which have not significantly alleviated her pain.  She continues to have significant pain and dysfunction which interferes with her daily activities.  At this time I recommend an MRI of the pelvis to evaluate for core muscle injury/athletic pubalgia.  Recommend follow-up after the MRI to discuss the results in person and any further treatment recommendations.  If she does have a core muscle injury she may be a candidate for a cortisone injection.  If she does fail conservative treatment she may require surgical intervention in the future.  The patient verbalizes her understanding agrees with the plan.  All questions were answered to her satisfaction.

## 2020-12-02 NOTE — HISTORY OF PRESENT ILLNESS
[de-identified] : Poonam is a pleasant 30-year-old female who presents my office today complaining of left groin pain.  The patient states that her pain began in June 2020 but denies any history of trauma or inciting event that she can recall.  That is gotten progressively worse over the past several months.  The pain is activity related and alleviated by rest.  It is worse when she squats or twist.  She also notes pain when she coughs or sneezes.  She has an ultrasound of her pelvis which was negative for any gynecological pathology.  She is also been seen and treated by a gastroenterologist in the last couple of months for epiploic appendagitis involving the distal descending colon.  Despite treatment she continues to have significant pain which continues to get worse despite conservative treatment measures.  She is taking anti-inflammatories which are not very helpful.  Is also been using medical marijuana which is somewhat helpful.  She has had no formal physical therapy or injections to her groin.  She has had x-rays of her hip which are negative for any pathology.  She has not had any advanced imaging of her hip or pelvis.  Of note, the patient also has started develop pain towards the back of the leg that is associated with occasional numbness, tingling, and burning sensations in her foot.  The patient states the pain occasionally shoots down the back of her leg.  She denies any fevers, chills, sweats, recent illnesses, weakness, or pain elsewhere at this time.

## 2021-01-06 ENCOUNTER — APPOINTMENT (OUTPATIENT)
Dept: ORTHOPEDIC SURGERY | Facility: CLINIC | Age: 31
End: 2021-01-06
Payer: MEDICAID

## 2021-01-06 VITALS
BODY MASS INDEX: 29.66 KG/M2 | WEIGHT: 178 LBS | DIASTOLIC BLOOD PRESSURE: 76 MMHG | HEART RATE: 69 BPM | SYSTOLIC BLOOD PRESSURE: 122 MMHG | HEIGHT: 65 IN

## 2021-01-06 PROCEDURE — 99072 ADDL SUPL MATRL&STAF TM PHE: CPT

## 2021-01-06 PROCEDURE — 99213 OFFICE O/P EST LOW 20 MIN: CPT

## 2021-01-18 ENCOUNTER — NON-APPOINTMENT (OUTPATIENT)
Age: 31
End: 2021-01-18

## 2021-02-03 ENCOUNTER — APPOINTMENT (OUTPATIENT)
Dept: ORTHOPEDIC SURGERY | Facility: CLINIC | Age: 31
End: 2021-02-03
Payer: MEDICAID

## 2021-02-03 VITALS
DIASTOLIC BLOOD PRESSURE: 78 MMHG | HEART RATE: 71 BPM | HEIGHT: 65 IN | SYSTOLIC BLOOD PRESSURE: 120 MMHG | BODY MASS INDEX: 29.66 KG/M2 | WEIGHT: 178 LBS

## 2021-02-03 PROCEDURE — 20610 DRAIN/INJ JOINT/BURSA W/O US: CPT | Mod: LT

## 2021-02-03 PROCEDURE — 99072 ADDL SUPL MATRL&STAF TM PHE: CPT

## 2021-02-03 PROCEDURE — 99213 OFFICE O/P EST LOW 20 MIN: CPT | Mod: 25

## 2021-02-03 NOTE — HISTORY OF PRESENT ILLNESS
[de-identified] : Poonam is a pleasant 30-year-old female who returns to the office today complaining of groin, left hip, and lower back pain.  She has been going to physical therapy and taking anti-inflammatories on an as-needed basis.  Unfortunately, her symptoms are not much improved since her last visit.  She states physical therapy occasionally aggravates the pain, particularly in her groin.  She also continues to complain of pain which shoots down from her lower back into her hip and is associated with numbness and tingling in her thigh.  She returns today to discuss any further treatment recommendations.  She denies any fevers, chills, sweats, weakness, or pain elsewhere at this time.

## 2021-02-03 NOTE — PHYSICAL EXAM
[de-identified] : Female chaperone was present for this exam\par \par General:\par Awake, alert, no acute distress, Patient was cooperative and appropriate during the examination.\par \par The patient's weight is overweight for height and age.\par \par Walks with a mildly antalgic gait on the left side.\par \par Full, painless range of motion of the neck and back.\par \par Exam of the bilateral lower extremities is intact and symmetric with regards to dermatologic, vascular, and neurologic exam. Bilateral lower extremity sensation is grossly intact to light touch in the DP/SP/T/S/S nerve distributions. Intact DF/PF/EHL. BIlateral lower extremity warm and well-perfused with brisk capillary refill.\par \par Pulmonary:\par Regular, nonlabored breathing\par \par Abdomen:\par Soft, nontender, nondistended.\par \par Lymphatic:\par No evidence of inguinal lymphadenopathy\par \par \par \par Left hip Examination:\par Physical examination of the hip demonstrates normal skin without signs of skin changes or abnormalities. No erythema, warmth, or joint effusion is appreciated.\par \par Sensation is intact to light touch L2-S1\par Palpable DP/PT pulse\par EHL/FHL/TA/GSC motor function intact\par \par Range of Motion\par 105 degrees of flexion to full extension\par 40 degrees of internal rotation\par 70 degrees of external rotation\par \par Strength Testing\par Hip Flexors/Hip Extensors 5/5\par Hip Abductors/Hip Adductors 5/5\par Quadriceps/Hamstring 5/5\par Patient is able to perform a straight leg raise without difficulty.\par \par Palpation\par Mildly tender to palpation about the pubic symphysis\par Moderately tender to palpation about the rectus insertion/conjoined tendon insertion\par Exquisitely tender to palpation about the adductor longus tendon on the left, less pain on the right\par \par Special Tests\par LISANDRO test negative\par FADIR test negative\par Celso test negative\par Resisted sit-ups mild discomfort\par Pain with valsalva mild discomfort\par \par \par \par \par \par  [de-identified] : MRI of the pelvis was reviewed the patient today in the office.  Patient has a tear of her left adductor longus tendon from its origin of the pubic symphysis.  Rectus tendon appears intact.  No fracture or dislocation.

## 2021-02-03 NOTE — DISCUSSION/SUMMARY
[de-identified] : Assessment: 30-year-old female with a left-sided adductor strain/core muscle injury as well as low back pain with left lower extremity radiculopathy\par \par Plan: I had a long discussion with the patient today regarding the nature of their diagnosis and treatment plan. We discussed the risks and benefits of no treatment as well as nonoperative and operative treatments.  I reviewed the patient's MRI of her pelvis again today with her in the office which is significant for an left-sided adductor longus tear.  The patient has been going to physical therapy and taking anti-inflammatory occasions which did not significantly helped with these symptoms.  For this reason we administered a left adductor longus tendon cortisone injection today in the office under sterile conditions with the presence of a female chaperone in the room.  Patient tolerated the injection well and was neurovascularly intact afterwards.  She is advised to take a break in physical therapy for the next 5 to 6 days to allow the medication to be absorbed.  She can ice the area as well for comfort.  She will resume physical therapy next week.  Regarding her lower back pain and left lower extremity numbness referral was given for one of our physiatry partners to evaluate her lower back and for further treatment recommendations.  Recommend follow-up in 6 to 8 weeks for repeat clinical evaluation.\par \par The patient verbalizes her understanding and agrees with the plan.  All questions were answered to their satisfaction.

## 2021-02-03 NOTE — PROCEDURE
[de-identified] : I injected the patient's left adductor longus tendon origin today with cortisone\par \par I discussed at length with the patient the planned steroid and lidocaine injection. The risks, benefits, convalescence and alternatives were reviewed. The possible side effects discussed included but were not limited to: pain, swelling, heat, bleeding, and redness. Symptoms are generally mild but if they are extensive then contact the office. Giving pain relievers by mouth such as NSAIDs or Tylenol can generally treat the reactions to steroid and lidocaine. Rare cases of infection have been noted. Rash, hives and itching may occur post injection. If you have muscle pain or cramps, flushing and or swelling of the face, rapid heart beat, nausea, dizziness, fever, chills, headache, difficulty breathing, swelling in the arms or legs, or have a prickly feeling of your skin, contact a health care provider immediately. Following this discussion, the left groin overlying the adductor longus tendon was prepped with Betadine and Alcohol and under sterile conditions the 40 mg Depo-Medrol and 2 cc Lidocaine injection was performed with a 22 gauge needle centered directly over the tendon origin near the pubis. The needle was introduced to the tendon sheath, aspiration was performed to ensure proper placement and the medication was injected. Upon withdrawal of the needle the site was cleaned with alcohol and a band aid applied. The patient tolerated the injection well and there were no adverse effects. Post injection instructions included no strenuous activity for 24 hours, cryotherapy and if there are any adverse effects to contact the office.

## 2021-02-19 ENCOUNTER — APPOINTMENT (OUTPATIENT)
Dept: ORTHOPEDIC SURGERY | Facility: CLINIC | Age: 31
End: 2021-02-19
Payer: MEDICAID

## 2021-02-19 VITALS
HEIGHT: 65 IN | SYSTOLIC BLOOD PRESSURE: 124 MMHG | DIASTOLIC BLOOD PRESSURE: 78 MMHG | WEIGHT: 178 LBS | HEART RATE: 100 BPM | BODY MASS INDEX: 29.66 KG/M2 | TEMPERATURE: 94 F

## 2021-02-19 PROCEDURE — 99213 OFFICE O/P EST LOW 20 MIN: CPT

## 2021-02-19 PROCEDURE — 99072 ADDL SUPL MATRL&STAF TM PHE: CPT

## 2021-02-26 ENCOUNTER — APPOINTMENT (OUTPATIENT)
Dept: PHYSICAL MEDICINE AND REHAB | Facility: CLINIC | Age: 31
End: 2021-02-26
Payer: MEDICAID

## 2021-02-26 VITALS
HEART RATE: 88 BPM | BODY MASS INDEX: 28.82 KG/M2 | RESPIRATION RATE: 14 BRPM | WEIGHT: 173 LBS | TEMPERATURE: 96 F | HEIGHT: 65 IN | DIASTOLIC BLOOD PRESSURE: 77 MMHG | SYSTOLIC BLOOD PRESSURE: 114 MMHG | OXYGEN SATURATION: 98 %

## 2021-02-26 PROCEDURE — 99203 OFFICE O/P NEW LOW 30 MIN: CPT

## 2021-02-26 PROCEDURE — 99072 ADDL SUPL MATRL&STAF TM PHE: CPT

## 2021-02-26 RX ORDER — METHYLPREDNISOLONE 4 MG/1
4 TABLET ORAL
Qty: 1 | Refills: 0 | Status: DISCONTINUED | COMMUNITY
Start: 2021-02-19 | End: 2021-02-26

## 2021-02-26 RX ORDER — NAPROXEN 500 MG/1
500 TABLET ORAL
Qty: 28 | Refills: 0 | Status: DISCONTINUED | COMMUNITY
Start: 2021-01-06 | End: 2021-02-26

## 2021-02-26 NOTE — PHYSICAL EXAM
[FreeTextEntry1] : NAD\par A&Ox3\par Overweight\par ROM L-spine: 1/2 full forward flexion before pain; 20' passive extension w/ pain (discordant)\par ROM Hips: smooth IR/ER w/o pain\par Pelvic tilt: none\par Seated slump test: neg\par SLR: neg left\par LISANDRO's: neg left\par FADIR's: ++ left\par DTR's: 2+ knees; 1+ ankles\par MMT: 5/5 b/l LE except 4/5 LEFT HIP ABD\par Sensation: SILT\par Toe & Heel Walk: Yes\par Palpation: LEFT SI JOINT MIN TTP (discordant); LEFT GT FEMUR TTP (discordant); LEFT ADD LONG ORIGIN PUBIC SYMPHYSIS TTP (concordant); LEFT PIRIFORMIS MUSCLE NTTP\par

## 2021-02-26 NOTE — ASSESSMENT
[FreeTextEntry1] : 30 y.o. F w/ left lateral hip, groin and thigh pain of unclear etiology.  Clinical suspicion for left hip impingement despite negative radiographic correlates.  MRI L-spine unremarkable.  No indication for spinal injection.  Left adductor longus CSI did not provide significant relief (although was not given under US guidance) and patient's pain is more diffuse involving the posterolateral hip/thigh.  No clinical concern for piriformis syndrome -> sciatic nerve irritation.  EDX studies may be considered.  Discussed R/B/A to US guided LEFT HIP LA block w/o corticosteroid to help better determine pain generator.  If + block, would obtain dedicated MRI Left Hip and consideration of arthroscopy.  Will discuss case w/ Dr. Aranda.

## 2021-02-26 NOTE — DATA REVIEWED
[MRI] : MRI [FreeTextEntry1] : MRI L-spine: L1-2: No disc herniation, spinal canal or neuroforaminal stenosis.  L2-3: No disc herniation, spinal canal or neuroforaminal stenosis.  L3-4: No disc herniation, spinal canal or neuroforaminal stenosis.  L4-5: No disc herniation, spinal canal or neuroforaminal stenosis.  L5-S1: Minimal disc bulge. No spinal canal or neuroforaminal stenosis.  LIMITED EVALUATION OF UPPER SACRUM AND SACROILIAC JOINTS: Unremarkable.  Normal appearance of the conus medullaris and cauda equina nerve roots. \par \par MRI Pelvis: no marrow edema; SI joints intact; b/l femoral heads spherical w/o AVN; no advanced hip OA or joint effusion; iliopsoas and gluteal tendon insertions and hamstring tendons intact; no iliopsoas or GT bursitis.  T2 hyperintensity left adductor origin and left pubic suggestive of subacute tear.

## 2021-02-26 NOTE — HISTORY OF PRESENT ILLNESS
[FreeTextEntry1] : 30 y.o. F w/ h/o left adductor muscle tear presents to office w/ c/o left groin pain and posterolateral hip pain since June 2020.  Pt. denies antecedent trauma or injury to pelvis or thigh.  Pt. relates h/o chronic, intermittent LBP.  Currently, pt. describes "waves" of pain radiating down the left posterolateral thigh w/ intermittent extension past knee into foot.  Has some N/T in toes left toe.  Pt. had one similar episode of pain in her right leg few days ago.  MRI pelvis and L-spine done and detailed below.  Pt. states that she recently stopped P.T. 2' increasing left hip/groin pain.  Pt. states that she just started taking medical marijuana for her pain.  Pt. had a non-guided CSI to left ADD LONG tendon which pt. states lasted less than one week.

## 2021-03-01 ENCOUNTER — APPOINTMENT (OUTPATIENT)
Dept: PHYSICAL MEDICINE AND REHAB | Facility: CLINIC | Age: 31
End: 2021-03-01
Payer: MEDICAID

## 2021-03-01 VITALS
HEART RATE: 106 BPM | WEIGHT: 172 LBS | OXYGEN SATURATION: 98 % | RESPIRATION RATE: 14 BRPM | SYSTOLIC BLOOD PRESSURE: 120 MMHG | DIASTOLIC BLOOD PRESSURE: 82 MMHG | BODY MASS INDEX: 28.66 KG/M2 | TEMPERATURE: 97 F | HEIGHT: 65 IN

## 2021-03-01 PROCEDURE — 99072 ADDL SUPL MATRL&STAF TM PHE: CPT

## 2021-03-01 PROCEDURE — 20611 DRAIN/INJ JOINT/BURSA W/US: CPT | Mod: LT

## 2021-03-03 ENCOUNTER — APPOINTMENT (OUTPATIENT)
Dept: ORTHOPEDIC SURGERY | Facility: CLINIC | Age: 31
End: 2021-03-03

## 2021-03-17 ENCOUNTER — APPOINTMENT (OUTPATIENT)
Dept: ORTHOPEDIC SURGERY | Facility: CLINIC | Age: 31
End: 2021-03-17

## 2021-03-19 ENCOUNTER — APPOINTMENT (OUTPATIENT)
Dept: ORTHOPEDIC SURGERY | Facility: CLINIC | Age: 31
End: 2021-03-19

## 2021-03-29 ENCOUNTER — OUTPATIENT (OUTPATIENT)
Dept: OUTPATIENT SERVICES | Facility: HOSPITAL | Age: 31
LOS: 1 days | End: 2021-03-29

## 2021-03-29 ENCOUNTER — RESULT REVIEW (OUTPATIENT)
Age: 31
End: 2021-03-29

## 2021-03-29 ENCOUNTER — APPOINTMENT (OUTPATIENT)
Dept: ULTRASOUND IMAGING | Facility: CLINIC | Age: 31
End: 2021-03-29
Payer: MEDICAID

## 2021-03-29 ENCOUNTER — APPOINTMENT (OUTPATIENT)
Dept: MRI IMAGING | Facility: CLINIC | Age: 31
End: 2021-03-29
Payer: MEDICAID

## 2021-03-29 DIAGNOSIS — M25.852 OTHER SPECIFIED JOINT DISORDERS, LEFT HIP: ICD-10-CM

## 2021-03-29 PROCEDURE — 73722 MRI JOINT OF LWR EXTR W/DYE: CPT | Mod: 26,LT

## 2021-03-29 PROCEDURE — 76942 ECHO GUIDE FOR BIOPSY: CPT | Mod: 26

## 2021-03-29 PROCEDURE — 27093 INJECTION FOR HIP X-RAY: CPT | Mod: LT

## 2021-04-14 ENCOUNTER — APPOINTMENT (OUTPATIENT)
Dept: ORTHOPEDIC SURGERY | Facility: CLINIC | Age: 31
End: 2021-04-14
Payer: MEDICAID

## 2021-04-14 PROCEDURE — 99441: CPT

## 2021-06-16 NOTE — PHYSICAL EXAM
Detail Level: Detailed [General Appearance - Alert] : alert [Oriented To Time, Place, And Person] : oriented to person, place, and time [Affect] : the affect was normal [General Appearance - In No Acute Distress] : in no acute distress [Memory Recent] : recent memory was not impaired [Memory Remote] : remote memory was not impaired [Cranial Nerves Optic (II)] : visual acuity intact bilaterally,  visual fields full to confrontation, pupils equal round and reactive to light [Cranial Nerves Oculomotor (III)] : extraocular motion intact [Cranial Nerves Trigeminal (V)] : facial sensation intact symmetrically [Cranial Nerves Facial (VII)] : face symmetrical [Cranial Nerves Vestibulocochlear (VIII)] : hearing was intact bilaterally [Cranial Nerves Glossopharyngeal (IX)] : tongue and palate midline [Motor Strength] : muscle strength was normal in all four extremities [Cranial Nerves Hypoglossal (XII)] : there was no tongue deviation with protrusion [Motor Tone] : muscle tone was normal in all four extremities [Cranial Nerves Accessory (XI - Cranial And Spinal)] : head turning and shoulder shrug symmetric [Sensation Pain / Temperature Decrease] : pain and temperature was intact [Sensation Tactile Decrease] : light touch was intact [Sensation Vibration Decrease] : vibration was intact [Abnormal Walk] : normal gait [2+] : Ankle jerk right 2+ [Optic Disc Abnormality] : the optic disc were normal in size and color [Edema] : there was no peripheral edema [Involuntary Movements] : no involuntary movements were seen [Current Events] : adequate knowledge of current events [Dysarthria] : no dysarthria [Aphasia] : no dysphasia/aphasia [Vocabulary] : adequate range of vocabulary [Romberg's Sign] : Romberg's sign was negtive [Coordination - Dysmetria Impaired Finger-to-Nose Bilateral] : not present [Plantar Reflex Right Only] : normal on the right [Plantar Reflex Left Only] : normal on the left [FreeTextEntry1] : There is some radicular pain on straight leg raising of the left leg.

## 2021-07-06 ENCOUNTER — APPOINTMENT (OUTPATIENT)
Dept: ORTHOPEDIC SURGERY | Facility: CLINIC | Age: 31
End: 2021-07-06
Payer: MEDICAID

## 2021-07-06 VITALS
BODY MASS INDEX: 27.82 KG/M2 | DIASTOLIC BLOOD PRESSURE: 78 MMHG | HEART RATE: 75 BPM | SYSTOLIC BLOOD PRESSURE: 115 MMHG | WEIGHT: 167 LBS | HEIGHT: 65 IN

## 2021-07-06 PROCEDURE — 99213 OFFICE O/P EST LOW 20 MIN: CPT

## 2021-07-22 ENCOUNTER — APPOINTMENT (OUTPATIENT)
Dept: ORTHOPEDIC SURGERY | Facility: CLINIC | Age: 31
End: 2021-07-22
Payer: MEDICAID

## 2021-07-22 VITALS
BODY MASS INDEX: 27.82 KG/M2 | HEART RATE: 78 BPM | WEIGHT: 167 LBS | DIASTOLIC BLOOD PRESSURE: 79 MMHG | SYSTOLIC BLOOD PRESSURE: 121 MMHG | HEIGHT: 65 IN

## 2021-07-22 PROCEDURE — 99214 OFFICE O/P EST MOD 30 MIN: CPT

## 2021-07-22 PROCEDURE — 72190 X-RAY EXAM OF PELVIS: CPT

## 2021-07-22 RX ORDER — CELECOXIB 200 MG/1
200 CAPSULE ORAL
Qty: 60 | Refills: 0 | Status: DISCONTINUED | COMMUNITY
Start: 2021-04-23 | End: 2021-07-22

## 2021-07-22 NOTE — HISTORY OF PRESENT ILLNESS
[Worsening] : worsening [___ yrs] : [unfilled] year(s) ago [3] : a current pain level of 3/10 [4] : an average pain level of 4/10 [Bending] : worsened by bending [Hip Movement] : worsened by hip movement [Rest] : relieved by rest [de-identified] : XUAN ROJAS is a 30 year female being seen for initial visit L hip pain. She reports pain started a year ago, just after cleaning entire house. She reports prolonged siting aggravates her pain. She states her pain started in groin, but moved to entire hip region. She saw Dr. Acosta and Dr. Pennington who injected cortisone that gave her 2 hr of relief. She received another injection few years ago that provided her week or less of relief. She reports intermittent clicking and popping that is not painful. She endorses numbness and tingling down to her extremity.

## 2021-07-22 NOTE — DISCUSSION/SUMMARY
[de-identified] : XUAN ROJAS is a 30 year female being seen for initial visit L hip pain. She reports pain started a year ago, just after cleaning entire house. She reports prolonged siting aggravates her pain. She states her pain started in groin, but moved to entire hip region. She saw Dr. Acosta and Dr. Pennington who injected cortisone that gave her 2 hr of relief. She received another injection few years ago that provided her week or less of relief. She reports intermittent clicking and popping that is not painful. She endorses numbness and tingling down to her extremity. \par \par We had a thorough discussion regarding the nature of her pain, the pathophysiology, as well as all treatment options. Based off of her clinical exam, MRI findings, radiographs,  I believe her primary issue is hip impingement along with labral tear, snapping hip, femoral acetabular impingement. Patient has tried and failed all conservative treatment options including the activity modification, rest, PT, HEP, injection and NSAIDs. Patient is considering surgical treatment. Today we have started discussion regarding hip arthroscopy, labral repair and possible allograft reconstruction, and femoral plasty. I discussed with her the advantages and disadvantages. We discussed postoperative plan and I provided her with a packet that discusses risks benefits and postoperative rehabilitation in detail. I also stated her that this procedure may not alleviate all of her symptoms. All risks, benefits and alternatives to the proposed surgical procedure, Left hip arthroscopy with femoroplasty and labral repair and possible allograft reconstruction, were discussed in great detail with the patient. Risks include but are not limited to pain, bleeding, infection, stiffness, medical complications (including DVT, PE, MI), and risks of anesthesia. The patient expressed understanding and all questions were answered. The patient is electing to proceed, and will have the patient scheduled accordingly. My coordinator will meet with her to discuss possible operative intervention and timing for surgery. \par \par

## 2021-07-22 NOTE — PHYSICAL EXAM
[de-identified] : Physical Exam:\par General: Well appearing, no acute distress\par Neurologic: A&Ox3, No focal deficits\par Head: NCAT without abrasions, lacerations, or ecchymosis to head, face, or scalp\par Eyes: No scleral icterus, no gross abnormalities\par Respiratory: Equal chest wall expansion bilaterally, no accessory muscle use\par Lymphatic: No lymphadenopathy palpated\par Skin: Warm and dry\par Psychiatric: Normal mood and affect\par \par \par On inspection of the Left hip shows no gross abnormalities. No loss muscle volume. Skin appears normal. When asked to point at the most painful part of their hip, they make a C sign\par Negative Heel strike, negative log roll. \par At 90° of flexion internal rotation is limited to 5°. Extremely painful in the groin. External rotation is limited to 30°. No pain. Hip flexor strength is 4-5 because of pain.  Anterior hip impingement signs are negative. No evidence of posterior impingement. \par Resisted straight leg raise does not produce groin pain. No signs of the iliopsoas tendinitis\par No audible or palpable clicking. \par On lateral decubitus examination there is no focal area of her greater trochanteric tenderness. Abductor strength is 5 out of 5.\par \par Motor strength is intact distally, 5/5 over quads,hamstring, EHL, FHL, GSC, TA.\par Sensation intact over L1-S1 dermatomes\par \par Right hip Exam\par \par ·	Skin: Clean/dry and intact\par ·	Inspection: No obvious deformity, no swelling.\par ·	Pulses: 2+ DP/PT pulses\par ·	ROM: 130 flexion without pain. Internal rotation to 15. External rotation to 45.\par ·	Painful ROM: None\par ·	Tenderness: No tenderness over greater trochanter/glut medius insertion. No groin pain. No ttp over the ASIS/Illiac crest.\par ·	Strength: 5/5 ADD/ABD/Q/H/TA/GS/EHL\par ·	Neuro: Sensation in tact to light touch throughout, DTR normal\par ·	Additional tests: Negative impingement test  [de-identified] : Bilateral AP, frog leg, Mcclain views views were performed today in the office. They demonstrate femoroacetabular impingement of the Left hip, questionable os acetabuli noted. Alpha angle is 47 with asphericiy of the femoral head. Lateral center edge angle is 54 Anterior center edge angle is 47. Tonnis grade 0. \par \par \par EXAM:  MR QUIROZ HIP LT+\par PROCEDURE DATE:  03/29/2021\par \par \par IMPRESSION:\par \par 1.  Mild fraying of the undersurface of the anterior labrum. No paralabral cyst.\par 2.  Normal femoral head neck morphology.\par \par

## 2021-07-22 NOTE — ADDENDUM
[FreeTextEntry1] : Documented by Gold Granados acting as a scribe for Dr. Lewis on 07/22/2021. \par \par All medical record entries made by the Scribe were at my, Dr. Lewis's, direction and\par personally dictated by me on 07/22/2021. I have reviewed the chart and agree that the record\par accurately reflects my personal performance of the history, physical exam, procedure and imaging.

## 2021-09-01 ENCOUNTER — APPOINTMENT (OUTPATIENT)
Dept: ORTHOPEDIC SURGERY | Facility: HOSPITAL | Age: 31
End: 2021-09-01

## 2021-09-14 ENCOUNTER — APPOINTMENT (OUTPATIENT)
Dept: ORTHOPEDIC SURGERY | Facility: CLINIC | Age: 31
End: 2021-09-14

## 2021-11-11 ENCOUNTER — APPOINTMENT (OUTPATIENT)
Dept: NEUROLOGY | Facility: CLINIC | Age: 31
End: 2021-11-11
Payer: MEDICAID

## 2021-11-11 VITALS
HEIGHT: 65 IN | SYSTOLIC BLOOD PRESSURE: 120 MMHG | DIASTOLIC BLOOD PRESSURE: 70 MMHG | WEIGHT: 170 LBS | BODY MASS INDEX: 28.32 KG/M2

## 2021-11-11 PROCEDURE — 99214 OFFICE O/P EST MOD 30 MIN: CPT

## 2021-11-11 NOTE — ASSESSMENT
[FreeTextEntry1] : This is a 31-year-old woman who has having episodes of unclear etiology including constipation, headaches, and dizziness.\par This is been occurring since her early 20s.\par Imaging has been negative in the past.\par \par Given the increase in frequency, I would like to repeat an MRI of the brain.\par I will also obtain an EEG. \par \par These may be related to use anxiety (panic attacks), however, organic etiologies must be ruled out.

## 2021-11-11 NOTE — DATA REVIEWED
[de-identified] : MRI of the lumbar spine was performed on 9/1/2020.\par The study was normal.\par There were no herniated discs.\par \par EMG of both lower extremities was normal.\par
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
## 2021-11-16 ENCOUNTER — APPOINTMENT (OUTPATIENT)
Dept: NEUROLOGY | Facility: CLINIC | Age: 31
End: 2021-11-16
Payer: MEDICAID

## 2021-11-16 PROCEDURE — 93040 RHYTHM ECG WITH REPORT: CPT

## 2021-11-16 PROCEDURE — 95819 EEG AWAKE AND ASLEEP: CPT

## 2021-11-18 ENCOUNTER — NON-APPOINTMENT (OUTPATIENT)
Age: 31
End: 2021-11-18

## 2022-01-12 ENCOUNTER — APPOINTMENT (OUTPATIENT)
Dept: NEUROLOGY | Facility: CLINIC | Age: 32
End: 2022-01-12
Payer: MEDICAID

## 2022-01-12 DIAGNOSIS — R42 DIZZINESS AND GIDDINESS: ICD-10-CM

## 2022-01-12 DIAGNOSIS — G44.89 OTHER HEADACHE SYNDROME: ICD-10-CM

## 2022-01-12 PROCEDURE — 99213 OFFICE O/P EST LOW 20 MIN: CPT | Mod: 95

## 2022-01-12 NOTE — ASSESSMENT
[FreeTextEntry1] : This is a 31 year-old woman who has having episodes of unclear etiology including constipation, headaches, and dizziness.\par This is been occurring since her early 20s.\par Imaging has been negative in the past.\par \par Neurologic work up has been negative.\par \par These may be related to use anxiety (panic attacks).\par She reports that they have been subsiding.\par \par I will see her back in 6 months.

## 2022-01-12 NOTE — DATA REVIEWED
[de-identified] : Brain MRI was performed on 11/22/21 at [facility].\par This study was normal. [de-identified] : EEG was normal. [de-identified] : MRI of the lumbar spine was performed on 9/1/2020.\par The study was normal.\par There were no herniated discs.\par \par EMG of both lower extremities was normal.\par
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
## 2022-01-13 ENCOUNTER — APPOINTMENT (OUTPATIENT)
Dept: ORTHOPEDIC SURGERY | Facility: CLINIC | Age: 32
End: 2022-01-13
Payer: MEDICAID

## 2022-01-13 VITALS
HEART RATE: 85 BPM | SYSTOLIC BLOOD PRESSURE: 116 MMHG | HEIGHT: 65 IN | DIASTOLIC BLOOD PRESSURE: 62 MMHG | BODY MASS INDEX: 28.32 KG/M2 | WEIGHT: 170 LBS

## 2022-01-13 PROCEDURE — 99213 OFFICE O/P EST LOW 20 MIN: CPT

## 2022-01-13 NOTE — HISTORY OF PRESENT ILLNESS
[de-identified] : 1/13/22: Poonam is a pleasant 31-year-old female returns to the office today for follow-up regarding her left hip and groin pain.  The patient states that her symptoms continue to bother her not significantly improved since the summer when I last saw her.  Her last office visit I referred her to Dr. Lewis for surgical consultation regarding her MACY and labral tear.  Surgical treatment was recommended and a hip arthroscopy was scheduled.  Unfortunately, the patient did not feel she had the means to rehabilitate after the surgery and canceled the procedure.  She is still interested in having the surgery with him but is unable to do so for the next few months.  She returns today to me to request an injection for the hip.  The patient denies any fevers, chills, sweats, recent illnesses, numbness, tingling, weakness, or pain elsewhere at this time.\par \par 7/6/2021: Poonam is a pleasant 30-year-old female who returns to the office today for follow-up regarding her left hip and groin pain.  The patient states her symptoms are unchanged since her previous visit.  She has been lost to follow-up since February 2021.  Last spoke with her over the phone in April 2021 and at that time I suggested following up with Dr. Wood for a possible image guided cortisone injection for her adductor longus tendon.  She never followed up with Dr. Wood after this conversation.  She has not been going to any physical therapy recently.  She is not taking any over-the-counter pain medicine.  She has been smoking a lot of marijuana which sometimes helps with the pain.  The patient denies any fevers, chills, sweats, recent illnesses, numbness, tingling, weakness, or pain elsewhere at this time.

## 2022-01-13 NOTE — PHYSICAL EXAM
[de-identified] : Female chaperone was present for this exam\par \par General:\par Awake, alert, no acute distress, Patient was cooperative and appropriate during the examination.\par \par The patient's weight is overweight for height and age.\par \par Walks with a mildly antalgic gait on the left side.\par \par Full, painless range of motion of the neck and back.\par \par Exam of the bilateral lower extremities is intact and symmetric with regards to dermatologic, vascular, and neurologic exam. Bilateral lower extremity sensation is grossly intact to light touch in the DP/SP/T/S/S nerve distributions. Intact DF/PF/EHL. BIlateral lower extremity warm and well-perfused with brisk capillary refill.\par \par Pulmonary:\par Regular, nonlabored breathing\par \par Abdomen:\par Soft, nontender, nondistended.\par \par Lymphatic:\par No evidence of inguinal lymphadenopathy\par \par \par \par Left hip Examination:\par Physical examination of the hip demonstrates normal skin without signs of skin changes or abnormalities. No erythema, warmth, or joint effusion is appreciated.\par \par Sensation is intact to light touch L2-S1\par Palpable DP/PT pulse\par EHL/FHL/TA/GSC motor function intact\par \par Range of Motion\par 105 degrees of flexion to full extension\par 40 degrees of internal rotation\par 70 degrees of external rotation\par \par Strength Testing\par Hip Flexors/Hip Extensors 5/5\par Hip Abductors/Hip Adductors 5/5\par Quadriceps/Hamstring 5/5\par Patient is able to perform a straight leg raise without difficulty.\par \par Palpation\par Minimally tender to palpation about the pubic symphysis\par Mildly tender to palpation about the rectus insertion/conjoined tendon insertion\par Mildly tender to palpation about the adductor longus tendon on the left, less pain on the right\par Moderately tender to palpation about the left hip joint\par \par Special Tests\par LISANDRO test positive for buttock pain\par FADIR test positive\par Celso test negative\par No pain with Valsalva or sit up\par \par \par Lower back examination:\par The patient skin is intact than any abrasions or ecchymosis.  No bony tenderness palpation of the lumbar or thoracic spinous processes.  No tenderness palpation of the posterior pelvis or sacrum.  No paraspinal muscle tenderness.  Motor strength is 5/5 for bilateral lower extremities.  Babinski's is normal, and the clonus is negative, deep tendon reflexes are 2+.\par \par \par  [de-identified] : MR arthrogram of the left hip was also reviewed with the patient today.  Date of procedure 3/29/2021.The patient has mild fraying of the undersurface the anterior labrum.  Normal femoral head neck morphology.\par \par MRI of the pelvis was reviewed the patient today in the office.  Patient has a tear of her left adductor longus tendon from its origin of the pubic symphysis.  Rectus tendon appears intact.  No fracture or dislocation.

## 2022-01-13 NOTE — DISCUSSION/SUMMARY
[de-identified] : Assessment: 31-year-old female with a left hip MACY and labral tear, less likely core muscle injury/adductor strain\par \par Plan: I had a long discussion with the patient today regarding the nature of their diagnosis and treatment plan. We discussed the risks and benefits of no treatment as well as nonoperative and operative treatments.  I reviewed the patient's left hip MR arthrogram with her today in the office which does demonstrate fraying of the acetabular labrum.  I also reviewed the patient's old MRI of her pelvis again with her today which demonstrates a strain of the left adductor longus tendon.  The patient states that she is still interested in having a hip arthroscopy with Dr. Lewis but since I gave her an injection to the adductor tendon in the office she returned to me to see if I could offer her this.  Based on her examination her signs and symptoms are more consistent with MACY and an ultrasound-guided hip joint cortisone injection could be considered.  The patient is not planning on having surgery the next few months.  Spoke with Dr. Lewis phone and he agrees that an injection is reasonable for both diagnostic and therapeutic purposes prior to surgery.  The ultrasound-guided injection was ordered for the patient today.  She will document and record how much relief in the longevity of relief that she experiences with this injection.  She will then follow-up with Dr. Lewis in the office in the next 8 weeks regarding her pathology and to discuss surgery again.  She may follow-up with me on an as-needed basis.\par \par The patient verbalizes their understanding and agrees with the plan.  All questions were answered to their satisfaction.

## 2022-01-14 ENCOUNTER — RESULT REVIEW (OUTPATIENT)
Age: 32
End: 2022-01-14

## 2022-01-24 ENCOUNTER — TRANSCRIPTION ENCOUNTER (OUTPATIENT)
Age: 32
End: 2022-01-24

## 2022-02-28 ENCOUNTER — OUTPATIENT (OUTPATIENT)
Dept: OUTPATIENT SERVICES | Facility: HOSPITAL | Age: 32
LOS: 1 days | End: 2022-02-28

## 2022-02-28 ENCOUNTER — APPOINTMENT (OUTPATIENT)
Dept: INTERVENTIONAL RADIOLOGY/VASCULAR | Facility: CLINIC | Age: 32
End: 2022-02-28
Payer: MEDICAID

## 2022-02-28 DIAGNOSIS — M25.552 PAIN IN LEFT HIP: ICD-10-CM

## 2022-02-28 PROCEDURE — 73525 CONTRAST X-RAY OF HIP: CPT | Mod: 26,LT

## 2022-02-28 PROCEDURE — 27093 INJECTION FOR HIP X-RAY: CPT | Mod: LT

## 2022-03-17 ENCOUNTER — APPOINTMENT (OUTPATIENT)
Dept: ORTHOPEDIC SURGERY | Facility: CLINIC | Age: 32
End: 2022-03-17
Payer: MEDICAID

## 2022-03-17 VITALS
HEART RATE: 103 BPM | DIASTOLIC BLOOD PRESSURE: 80 MMHG | SYSTOLIC BLOOD PRESSURE: 122 MMHG | BODY MASS INDEX: 28.32 KG/M2 | WEIGHT: 170 LBS | HEIGHT: 65 IN

## 2022-03-17 PROCEDURE — 99214 OFFICE O/P EST MOD 30 MIN: CPT
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
## 2022-03-17 NOTE — ADDENDUM
[FreeTextEntry1] : Documented by Gold Granados acting as a scribe for Dr. Lewis on 03/17/2022. \par \par All medical record entries made by the Scribe were at my, Dr. Lewis's, direction and\par personally dictated by me on 03/17/2022. I have reviewed the chart and agree that the record\par accurately reflects my personal performance of the history, physical exam, procedure and imaging.

## 2022-03-17 NOTE — PHYSICAL EXAM
[de-identified] : Physical Exam:\par General: Well appearing, no acute distress\par Neurologic: A&Ox3, No focal deficits\par Head: NCAT without abrasions, lacerations, or ecchymosis to head, face, or scalp\par Eyes: No scleral icterus, no gross abnormalities\par Respiratory: Equal chest wall expansion bilaterally, no accessory muscle use\par Lymphatic: No lymphadenopathy palpated\par Skin: Warm and dry\par Psychiatric: Normal mood and affect\par \par \par On inspection of the Left hip shows no gross abnormalities. No loss muscle volume. Skin appears normal. When asked to point at the most painful part of their hip, they make a C sign\par Negative Heel strike, negative log roll. \par At 90° of flexion internal rotation is limited to 5°. Extremely painful in the groin. External rotation is limited to 30°. No pain. Hip flexor strength is 4-5 because of pain.  Anterior hip impingement signs are +No evidence of posterior impingement. \par Resisted straight leg raise does produce groin pain. No signs of the iliopsoas tendinitis\par No audible or palpable clicking. \par On lateral decubitus examination there is no focal area of her greater trochanteric tenderness. Abductor strength is 5 out of 5.\par \par Motor strength is intact distally, 5/5 over quads,hamstring, EHL, FHL, GSC, TA.\par Sensation intact over L1-S1 dermatomes\par \par Right hip Exam\par \par ·	Skin: Clean/dry and intact\par ·	Inspection: No obvious deformity, no swelling.\par ·	Pulses: 2+ DP/PT pulses\par ·	ROM: 130 flexion without pain. Internal rotation to 15. External rotation to 45.\par ·	Painful ROM: None\par ·	Tenderness: No tenderness over greater trochanter/glut medius insertion. No groin pain. No ttp over the ASIS/Illiac crest.\par ·	Strength: 5/5 ADD/ABD/Q/H/TA/GS/EHL\par ·	Neuro: Sensation in tact to light touch throughout, DTR normal\par ·	Additional tests: Negative impingement test  [de-identified] :  Bilateral AP, frog leg, Mcclain views views were reviewed today in the office. They demonstrate femoroacetabular impingement of the Left hip, questionable os acetabuli noted. Alpha angle is 47 with asphericiy of the femoral head. Lateral center edge angle is 54 Anterior center edge angle is 47. Tonnis grade 0. \par \par Procedure: MRI of Pelvis ([facility])\par Dated: 01/2/2022\par \par Impression:\par Subacute mildly retracted tear at the left adductor origin with interposed T2 signal hyperintensity; no associated osseous and soft tissue edema.\par \par EXAM: MR ARTHRO HIP LT+ ([facility])\par PROCEDURE DATE: 03/29/2021\par IMPRESSION:\par \par 1. Mild fraying of the undersurface of the anterior labrum. No paralabral cyst.\par 2. Normal femoral head neck morphology.\par \par \par

## 2022-03-18 ENCOUNTER — TRANSCRIPTION ENCOUNTER (OUTPATIENT)
Age: 32
End: 2022-03-18

## 2022-04-19 ENCOUNTER — OUTPATIENT (OUTPATIENT)
Dept: OUTPATIENT SERVICES | Facility: HOSPITAL | Age: 32
LOS: 1 days | End: 2022-04-19
Payer: MEDICAID

## 2022-04-19 VITALS
SYSTOLIC BLOOD PRESSURE: 117 MMHG | OXYGEN SATURATION: 100 % | TEMPERATURE: 99 F | WEIGHT: 163.14 LBS | DIASTOLIC BLOOD PRESSURE: 69 MMHG | HEART RATE: 82 BPM | RESPIRATION RATE: 16 BRPM | HEIGHT: 65 IN

## 2022-04-19 DIAGNOSIS — M25.852 OTHER SPECIFIED JOINT DISORDERS, LEFT HIP: ICD-10-CM

## 2022-04-19 DIAGNOSIS — Z01.818 ENCOUNTER FOR OTHER PREPROCEDURAL EXAMINATION: ICD-10-CM

## 2022-04-19 DIAGNOSIS — Z98.890 OTHER SPECIFIED POSTPROCEDURAL STATES: Chronic | ICD-10-CM

## 2022-04-19 DIAGNOSIS — K08.409 PARTIAL LOSS OF TEETH, UNSPECIFIED CAUSE, UNSPECIFIED CLASS: Chronic | ICD-10-CM

## 2022-04-19 DIAGNOSIS — Z92.89 PERSONAL HISTORY OF OTHER MEDICAL TREATMENT: Chronic | ICD-10-CM

## 2022-04-19 LAB
ANION GAP SERPL CALC-SCNC: 4 MMOL/L — LOW (ref 5–17)
APTT BLD: 29.6 SEC — SIGNIFICANT CHANGE UP (ref 27.5–35.5)
BASOPHILS # BLD AUTO: 0.02 K/UL — SIGNIFICANT CHANGE UP (ref 0–0.2)
BASOPHILS NFR BLD AUTO: 0.2 % — SIGNIFICANT CHANGE UP (ref 0–2)
BUN SERPL-MCNC: 12 MG/DL — SIGNIFICANT CHANGE UP (ref 7–23)
CALCIUM SERPL-MCNC: 9.2 MG/DL — SIGNIFICANT CHANGE UP (ref 8.5–10.1)
CHLORIDE SERPL-SCNC: 111 MMOL/L — HIGH (ref 96–108)
CO2 SERPL-SCNC: 26 MMOL/L — SIGNIFICANT CHANGE UP (ref 22–31)
CREAT SERPL-MCNC: 0.68 MG/DL — SIGNIFICANT CHANGE UP (ref 0.5–1.3)
EGFR: 118 ML/MIN/1.73M2 — SIGNIFICANT CHANGE UP
EOSINOPHIL # BLD AUTO: 0.07 K/UL — SIGNIFICANT CHANGE UP (ref 0–0.5)
EOSINOPHIL NFR BLD AUTO: 0.8 % — SIGNIFICANT CHANGE UP (ref 0–6)
GLUCOSE SERPL-MCNC: 83 MG/DL — SIGNIFICANT CHANGE UP (ref 70–99)
HCT VFR BLD CALC: 39 % — SIGNIFICANT CHANGE UP (ref 34.5–45)
HGB BLD-MCNC: 13 G/DL — SIGNIFICANT CHANGE UP (ref 11.5–15.5)
IMM GRANULOCYTES NFR BLD AUTO: 0.2 % — SIGNIFICANT CHANGE UP (ref 0–1.5)
INR BLD: 1.08 RATIO — SIGNIFICANT CHANGE UP (ref 0.88–1.16)
LYMPHOCYTES # BLD AUTO: 2.55 K/UL — SIGNIFICANT CHANGE UP (ref 1–3.3)
LYMPHOCYTES # BLD AUTO: 27.4 % — SIGNIFICANT CHANGE UP (ref 13–44)
MCHC RBC-ENTMCNC: 32.4 PG — SIGNIFICANT CHANGE UP (ref 27–34)
MCHC RBC-ENTMCNC: 33.3 GM/DL — SIGNIFICANT CHANGE UP (ref 32–36)
MCV RBC AUTO: 97.3 FL — SIGNIFICANT CHANGE UP (ref 80–100)
MONOCYTES # BLD AUTO: 0.46 K/UL — SIGNIFICANT CHANGE UP (ref 0–0.9)
MONOCYTES NFR BLD AUTO: 5 % — SIGNIFICANT CHANGE UP (ref 2–14)
NEUTROPHILS # BLD AUTO: 6.17 K/UL — SIGNIFICANT CHANGE UP (ref 1.8–7.4)
NEUTROPHILS NFR BLD AUTO: 66.4 % — SIGNIFICANT CHANGE UP (ref 43–77)
PLATELET # BLD AUTO: 291 K/UL — SIGNIFICANT CHANGE UP (ref 150–400)
POTASSIUM SERPL-MCNC: 4.2 MMOL/L — SIGNIFICANT CHANGE UP (ref 3.5–5.3)
POTASSIUM SERPL-SCNC: 4.2 MMOL/L — SIGNIFICANT CHANGE UP (ref 3.5–5.3)
PROTHROM AB SERPL-ACNC: 12.5 SEC — SIGNIFICANT CHANGE UP (ref 10.5–13.4)
RBC # BLD: 4.01 M/UL — SIGNIFICANT CHANGE UP (ref 3.8–5.2)
RBC # FLD: 12.8 % — SIGNIFICANT CHANGE UP (ref 10.3–14.5)
SODIUM SERPL-SCNC: 141 MMOL/L — SIGNIFICANT CHANGE UP (ref 135–145)
WBC # BLD: 9.29 K/UL — SIGNIFICANT CHANGE UP (ref 3.8–10.5)
WBC # FLD AUTO: 9.29 K/UL — SIGNIFICANT CHANGE UP (ref 3.8–10.5)

## 2022-04-19 PROCEDURE — 85610 PROTHROMBIN TIME: CPT

## 2022-04-19 PROCEDURE — 85025 COMPLETE CBC W/AUTO DIFF WBC: CPT

## 2022-04-19 PROCEDURE — 93010 ELECTROCARDIOGRAM REPORT: CPT

## 2022-04-19 PROCEDURE — G0463: CPT | Mod: 25

## 2022-04-19 PROCEDURE — 80048 BASIC METABOLIC PNL TOTAL CA: CPT

## 2022-04-19 PROCEDURE — 36415 COLL VENOUS BLD VENIPUNCTURE: CPT

## 2022-04-19 PROCEDURE — 93005 ELECTROCARDIOGRAM TRACING: CPT

## 2022-04-19 PROCEDURE — 85730 THROMBOPLASTIN TIME PARTIAL: CPT

## 2022-04-19 RX ORDER — IBUPROFEN 200 MG
1 TABLET ORAL
Qty: 0 | Refills: 0 | DISCHARGE

## 2022-04-19 RX ORDER — ALBUTEROL 90 UG/1
2 AEROSOL, METERED ORAL
Qty: 0 | Refills: 0 | DISCHARGE

## 2022-04-19 NOTE — H&P PST ADULT - MALLAMPATI CLASS
Alert-The patient is alert, awake and responds to voice. The patient is oriented to time, place, and person. The triage nurse is able to obtain subjective information. Class I (easy) - visualization of the soft palate, fauces, uvula, and both anterior and posterior pillars
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
## 2022-04-19 NOTE — H&P PST ADULT - GASTROINTESTINAL
Patient Information  Shingles #1 given today .     Lab -- Fasting labwork has been ordered for you.    Fasting Labs Diet Restrictions-- as soon as possible.  Please come prior to your next appointment to recheck fasting labs.  · Please come fasting (at least 8 hours) to your next appointment to recheck labs.  · Please drink plenty of water before your appointment.  · You can take any prescribed or routine medicine with water before your appointment.  · You can brush your teeth even if you are fasting.      Follow Up  -- Follow up with your regular Primary Care Provider as scheduled in 6 months for a video visit.    Additional Educational Resources:  For additional resources regarding your symptoms, diagnosis, or further health information, please visit the Health Resources section on [email or web address] or the Online Health Resources section in [email or web address].    Jenna:  [id number]  [id number]   [phone]   Monday - Thursday 6:30 a.m. to 6:00 p.m.   Friday 6:30 a.m. to 5:00 p.m.   Saturday 6:30 - noon            details… detailed exam

## 2022-04-19 NOTE — H&P PST ADULT - HISTORY OF PRESENT ILLNESS
31 y.o  31 y.o WD, WN female presents to PST with hx of left hip pain for the last two years. She followed with ortho and treated conservatively with medication, physical therapy and injections. She did not get any relief, pain worsened. She had further evaluation and diagnostics which revealed a torn labrum in her left hip. She is now scheduled for Left Hip Arthroscopy, Labral Repair, possible Reconstruction, Acetabuloplasty, Femoroplasty, Capsule Repair
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
## 2022-04-19 NOTE — H&P PST ADULT - ASSESSMENT
31 y.o female scheduled for  31 y.o female scheduled for  Left Hip Arthroscopy, Labral Repair, possible Reconstruction, Acetabuloplasty, Femoroplasty, Capsule Repair   Plan  1. Stop all NSAIDS, herbal supplements and vitamins for 7 days.  2. NPO at midnight.  3. Take the following medications --none-- with small sips of water on the morning of your procedure/surgery.  4. Use EZ sponges as directed  5. Labs, EKG as per surgeon  6. PMD VIRGEN Seo visit for optimization prior to surgery as per surgeon  7. COVID swab appt: 4/22/2022

## 2022-04-19 NOTE — H&P PST ADULT - ATTENDING COMMENTS
Orthopedic Sports Attending:    Agree with above resident/PA note.  Note edited where necessary.      Patient seen and examined at bedside. Discussed the risks, complications, benefits and alternatives of care. Confirmed procedure and site. Patient fully understands and would like to proceed with surgery.    Raheel Lewis, DO  Orthopaedic Surgery

## 2022-04-19 NOTE — H&P PST ADULT - DOES THIS PATIENT HAVE A HISTORY OF OR HAS BEEN DX WITH HEART FAILURE?
Consent: Written consent obtained, risks reviewed including but not limited to crusting, scabbing, blistering, scarring, darker or lighter pigmentary change, bruising, and/or incomplete response. Vacuum Pressure: 2 Post-Care Instructions: I reviewed with the patient in detail post-care instructions. Patient should stay away from the sun and wear sun protection until treated areas are fully healed. Treatment Number: 1 Crystal Flow: medium Price (Use Numbers Only, No Special Characters Or $): 75.00 Prep Text: The patient's skin was cleaned and prepped. Detail Level: Zone Indication: skin texture no

## 2022-04-19 NOTE — H&P PST ADULT - NSICDXPASTSURGICALHX_GEN_ALL_CORE_FT
PAST SURGICAL HISTORY:  H/O arthroscopy of knee Right---3/2017    H/O arthroscopy of shoulder Left shoulder RTC & Labrum Repair---2/15/2022  Right shoulder RTC--3/2017    H/O wisdom tooth extraction age 18's    History of myelography Myelogram--2018

## 2022-04-19 NOTE — H&P PST ADULT - MUSCULOSKELETAL COMMENTS
See HPI; recent left shoulder surgery 2/2022 , pain, undergoing physical therapy; lower back pain left hip site clear, pain on palpation

## 2022-04-20 DIAGNOSIS — Z01.818 ENCOUNTER FOR OTHER PREPROCEDURAL EXAMINATION: ICD-10-CM

## 2022-04-20 DIAGNOSIS — M25.852 OTHER SPECIFIED JOINT DISORDERS, LEFT HIP: ICD-10-CM

## 2022-04-25 ENCOUNTER — RESULT REVIEW (OUTPATIENT)
Age: 32
End: 2022-04-25

## 2022-04-25 ENCOUNTER — OUTPATIENT (OUTPATIENT)
Dept: INPATIENT UNIT | Facility: HOSPITAL | Age: 32
LOS: 1 days | Discharge: HOME | End: 2022-04-25
Payer: MEDICAID

## 2022-04-25 ENCOUNTER — TRANSCRIPTION ENCOUNTER (OUTPATIENT)
Age: 32
End: 2022-04-25

## 2022-04-25 ENCOUNTER — APPOINTMENT (OUTPATIENT)
Dept: ORTHOPEDIC SURGERY | Facility: HOSPITAL | Age: 32
End: 2022-04-25

## 2022-04-25 VITALS
TEMPERATURE: 98 F | OXYGEN SATURATION: 100 % | RESPIRATION RATE: 14 BRPM | SYSTOLIC BLOOD PRESSURE: 118 MMHG | HEART RATE: 86 BPM | DIASTOLIC BLOOD PRESSURE: 80 MMHG

## 2022-04-25 VITALS
HEIGHT: 65 IN | WEIGHT: 163.14 LBS | DIASTOLIC BLOOD PRESSURE: 65 MMHG | SYSTOLIC BLOOD PRESSURE: 119 MMHG | TEMPERATURE: 98 F | RESPIRATION RATE: 15 BRPM | OXYGEN SATURATION: 100 % | HEART RATE: 79 BPM

## 2022-04-25 DIAGNOSIS — Z92.89 PERSONAL HISTORY OF OTHER MEDICAL TREATMENT: Chronic | ICD-10-CM

## 2022-04-25 DIAGNOSIS — M25.852 OTHER SPECIFIED JOINT DISORDERS, LEFT HIP: ICD-10-CM

## 2022-04-25 DIAGNOSIS — S73.192A OTHER SPRAIN OF LEFT HIP, INITIAL ENCOUNTER: ICD-10-CM

## 2022-04-25 DIAGNOSIS — Z98.890 OTHER SPECIFIED POSTPROCEDURAL STATES: Chronic | ICD-10-CM

## 2022-04-25 DIAGNOSIS — K08.409 PARTIAL LOSS OF TEETH, UNSPECIFIED CAUSE, UNSPECIFIED CLASS: Chronic | ICD-10-CM

## 2022-04-25 LAB — HCG UR QL: NEGATIVE — SIGNIFICANT CHANGE UP

## 2022-04-25 PROCEDURE — 29916 HIP ARTHRO W/LABRAL REPAIR: CPT | Mod: LT

## 2022-04-25 PROCEDURE — 88304 TISSUE EXAM BY PATHOLOGIST: CPT

## 2022-04-25 PROCEDURE — C1713: CPT

## 2022-04-25 PROCEDURE — 88304 TISSUE EXAM BY PATHOLOGIST: CPT | Mod: 26

## 2022-04-25 PROCEDURE — 76000 FLUOROSCOPY <1 HR PHYS/QHP: CPT

## 2022-04-25 PROCEDURE — 29999 UNLISTED PX ARTHROSCOPY: CPT | Mod: LT

## 2022-04-25 PROCEDURE — 29914 HIP ARTHRO W/FEMOROPLASTY: CPT | Mod: LT

## 2022-04-25 PROCEDURE — 81025 URINE PREGNANCY TEST: CPT

## 2022-04-25 RX ORDER — OXYCODONE HYDROCHLORIDE 5 MG/1
5 TABLET ORAL ONCE
Refills: 0 | Status: DISCONTINUED | OUTPATIENT
Start: 2022-04-25 | End: 2022-04-25

## 2022-04-25 RX ORDER — SODIUM CHLORIDE 9 MG/ML
1000 INJECTION, SOLUTION INTRAVENOUS
Refills: 0 | Status: DISCONTINUED | OUTPATIENT
Start: 2022-04-25 | End: 2022-04-25

## 2022-04-25 RX ORDER — OXYCODONE HYDROCHLORIDE 5 MG/1
1 TABLET ORAL
Qty: 28 | Refills: 0
Start: 2022-04-25 | End: 2022-05-01

## 2022-04-25 RX ORDER — OXYCODONE HYDROCHLORIDE 5 MG/1
10 TABLET ORAL ONCE
Refills: 0 | Status: DISCONTINUED | OUTPATIENT
Start: 2022-04-25 | End: 2022-04-25

## 2022-04-25 RX ORDER — FENTANYL CITRATE 50 UG/ML
50 INJECTION INTRAVENOUS
Refills: 0 | Status: DISCONTINUED | OUTPATIENT
Start: 2022-04-25 | End: 2022-04-25

## 2022-04-25 RX ORDER — ASPIRIN/CALCIUM CARB/MAGNESIUM 324 MG
1 TABLET ORAL
Qty: 28 | Refills: 0
Start: 2022-04-25 | End: 2022-05-22

## 2022-04-25 RX ORDER — INDOMETHACIN 50 MG
1 CAPSULE ORAL
Qty: 42 | Refills: 0
Start: 2022-04-25 | End: 2022-05-08

## 2022-04-25 RX ORDER — DOCUSATE SODIUM 100 MG
1 CAPSULE ORAL
Qty: 21 | Refills: 0
Start: 2022-04-25 | End: 2022-05-01

## 2022-04-25 RX ORDER — ONDANSETRON 8 MG/1
1 TABLET, FILM COATED ORAL
Qty: 21 | Refills: 0
Start: 2022-04-25 | End: 2022-05-01

## 2022-04-25 RX ORDER — HYDROMORPHONE HYDROCHLORIDE 2 MG/ML
0.5 INJECTION INTRAMUSCULAR; INTRAVENOUS; SUBCUTANEOUS
Refills: 0 | Status: DISCONTINUED | OUTPATIENT
Start: 2022-04-25 | End: 2022-04-25

## 2022-04-25 RX ORDER — CYCLOBENZAPRINE HYDROCHLORIDE 10 MG/1
1 TABLET, FILM COATED ORAL
Qty: 21 | Refills: 0
Start: 2022-04-25 | End: 2022-05-01

## 2022-04-25 RX ORDER — CYCLOBENZAPRINE HYDROCHLORIDE 10 MG/1
1 TABLET, FILM COATED ORAL
Qty: 0 | Refills: 0 | DISCHARGE

## 2022-04-25 RX ORDER — ACETAMINOPHEN 500 MG
1000 TABLET ORAL ONCE
Refills: 0 | Status: DISCONTINUED | OUTPATIENT
Start: 2022-04-25 | End: 2022-04-25

## 2022-04-25 RX ADMIN — HYDROMORPHONE HYDROCHLORIDE 0.5 MILLIGRAM(S): 2 INJECTION INTRAMUSCULAR; INTRAVENOUS; SUBCUTANEOUS at 10:13

## 2022-04-25 RX ADMIN — FENTANYL CITRATE 50 MICROGRAM(S): 50 INJECTION INTRAVENOUS at 09:51

## 2022-04-25 RX ADMIN — HYDROMORPHONE HYDROCHLORIDE 0.5 MILLIGRAM(S): 2 INJECTION INTRAMUSCULAR; INTRAVENOUS; SUBCUTANEOUS at 10:21

## 2022-04-25 RX ADMIN — OXYCODONE HYDROCHLORIDE 10 MILLIGRAM(S): 5 TABLET ORAL at 09:45

## 2022-04-25 NOTE — BRIEF OPERATIVE NOTE - NSICDXBRIEFPROCEDURE_GEN_ALL_CORE_FT
PROCEDURES:  Hip arthroscopy 25-Apr-2022 09:39:49 L hip arthroscopy, labral repair, femoroplasty, acetabuloplasty Talib Mccoy
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
## 2022-04-25 NOTE — ASU DISCHARGE PLAN (ADULT/PEDIATRIC) - CARE PROVIDER_API CALL
Raheel Lewis (DO)  [address], [address]  [place] [address]  Phone: [phone]  Fax: [phone]  Follow Up Time:

## 2022-04-25 NOTE — ASU PATIENT PROFILE, ADULT - FALL HARM RISK - UNIVERSAL INTERVENTIONS
Bed in lowest position, wheels locked, appropriate side rails in place/Call bell, personal items and telephone in reach/Instruct patient to call for assistance before getting out of bed or chair/Non-slip footwear when patient is out of bed/[place] to call system/Physically safe environment - no spills, clutter or unnecessary equipment/Purposeful Proactive Rounding/Room/bathroom lighting operational, light cord in reach

## 2022-04-25 NOTE — ASU DISCHARGE PLAN (ADULT/PEDIATRIC) - NS MD DC FALL RISK RISK
For information on Fall & Injury Prevention, visit: [email or web address] OR  [email or web address] OR  https://www.cdc.gov/steadi/patient.html

## 2022-04-25 NOTE — ASU DISCHARGE PLAN (ADULT/PEDIATRIC) - ASU DC SPECIAL INSTRUCTIONSFT
Hip Arthroscopy Post op    ACTIVITY: Usually during the first 2 or 3 days, there is not a lot of moving around.  We do encourage you to get about with the crutches intermittently to do your usual daily activities (i.e. use the restroom, etc.)     PAIN MEDS:  We will send an eRx prescription to your pharmacy narcotic pain medication on the day of your surgery.  We do not generally write these before that day.  As your pain gets better, you can switch to Tylenol and/or ibuprofen.     CRUTCHES: Most need crutches for 2-4 weeks.  This means you can touch your foot down to the floor, but not put all of your weight on it.  If you have an isolated labral tear (no impingement/no osteoplasty), you will need crutches for 2 weeks.     STITCHES:  will be removed at about 2 weeks.     SCHOOL/WORK: Generally, most students miss about a week of school.  For work, it depends on what you do. Time out from work is also variable.  This will not only depend on the procedure but what your work requirements are. If you have a job that is mostly sitting you should be able to return in about a week. If you have a physical job you should discuss this with Dr. Lewis or ERIC Cantu.     DRIVING: You can drive once you are fully weight bearing and it is comfortable to do so. You must be able to perform an emergency stop without hesitation. It may be 1-2 weeks before you are comfortable driving if it is your left hip and the car is an automatic.  If surgery is on your right hip then you will not be able to drive from anywhere between 2- 4 weeks. You must be off of pain medication to drive.     PHYSICAL THERAPY: You will get a prescription for this. You can schedule your first PT visit anytime for about one week after.  Therapy is usually two to three times per week.     SHOWERING: You may shower 2 days post-op with a water proof dressing, but soaking in a tub should be avoided until your 2 week appointment to check if wounds are healed.     **Please refer to patient post op info packet given to you at office visit for furter details. Hip Arthroscopy Post op    ACTIVITY: Usually during the first 2 or 3 days, there is not a lot of moving around.  We do encourage you to get about with the crutches intermittently to do your usual daily activities (i.e. use the restroom, etc.)     PAIN MEDS:  We will send an eRx prescription to your pharmacy narcotic pain medication on the day of your surgery.  We do not generally write these before that day.  As your pain gets better, you can switch to Tylenol and/or ibuprofen.     CRUTCHES: Most need crutches for 2-4 weeks.  This means you can touch your foot down to the floor, but not put all of your weight on it.  If you have an isolated labral tear (no impingement/no osteoplasty), you will need crutches for 2 weeks.     STITCHES:  will be removed at about 2 weeks.     SCHOOL/WORK: Generally, most students miss about a week of school.  For work, it depends on what you do. Time out from work is also variable.  This will not only depend on the procedure but what your work requirements are. If you have a job that is mostly sitting you should be able to return in about a week. If you have a physical job you should discuss this with Dr. Lewis or ERIC Cantu.     DRIVING: You can drive once you are fully weight bearing and it is comfortable to do so. You must be able to perform an emergency stop without hesitation. It may be 1-2 weeks before you are comfortable driving if it is your left hip and the car is an automatic.  If surgery is on your right hip then you will not be able to drive from anywhere between 2- 4 weeks. You must be off of pain medication to drive.     PHYSICAL THERAPY: You will get a prescription for this. You can schedule your first PT visit anytime for about one week after.  Therapy is usually two to three times per week.     SHOWERING: You may shower 2 days post-op with a water proof dressing, but soaking in a tub should be avoided until your 2 week appointment to check if wounds are healed.     BRACE: The Hip Abduction brace should remain on for WALKING. You do not need to wear it in bed or in the car or sitting.      **Please refer to patient post op info packet given to you at office visit for furter details.

## 2022-04-27 DIAGNOSIS — Z88.5 ALLERGY STATUS TO NARCOTIC AGENT: ICD-10-CM

## 2022-04-27 DIAGNOSIS — M24.152 OTHER ARTICULAR CARTILAGE DISORDERS, LEFT HIP: ICD-10-CM

## 2022-04-27 DIAGNOSIS — M76.02 GLUTEAL TENDINITIS, LEFT HIP: ICD-10-CM

## 2022-04-27 DIAGNOSIS — M51.26 OTHER INTERVERTEBRAL DISC DISPLACEMENT, LUMBAR REGION: ICD-10-CM

## 2022-04-27 DIAGNOSIS — M25.852 OTHER SPECIFIED JOINT DISORDERS, LEFT HIP: ICD-10-CM

## 2022-04-27 DIAGNOSIS — F41.9 ANXIETY DISORDER, UNSPECIFIED: ICD-10-CM

## 2022-05-10 ENCOUNTER — APPOINTMENT (OUTPATIENT)
Dept: ORTHOPEDIC SURGERY | Facility: CLINIC | Age: 32
End: 2022-05-10
Payer: MEDICAID

## 2022-05-10 PROCEDURE — 99024 POSTOP FOLLOW-UP VISIT: CPT

## 2022-05-10 PROCEDURE — 73502 X-RAY EXAM HIP UNI 2-3 VIEWS: CPT

## 2022-05-10 NOTE — ADDENDUM
[FreeTextEntry1] : Documented by Gold Granados acting as a scribe for Dr. Lewis and Rivera Traoer PA-C on 05/10/2022. \par \par All medical record entries made by the Scribe were at my, Rivera Traore's, direction and\par personally dictated by me on 05/10/2022. I have reviewed the chart and agree that the record\par accurately reflects my personal performance of the history, physical exam, procedure and imaging.

## 2022-05-10 NOTE — HISTORY OF PRESENT ILLNESS
[___ Weeks Post Op] : [unfilled] weeks post op [2] : the patient reports pain that is 2/10 in severity [Clean/Dry/Intact] : clean, dry and intact [Neuro Intact] : an unremarkable neurological exam [Vascular Intact] : ~T peripheral vascular exam normal [Xray (Date:___)] : [unfilled] Xray -  [Doing Well] : is doing well [Chills] : no chills [Fever] : no fever [Nausea] : no nausea [Vomiting] : no vomiting [Discharge] : absent of discharge [Dehiscence] : not dehisced [de-identified] : spo L hip arthroscopy, femoroplasty, acetabuloplasty, labral repair. DOS: 04/25/2021 [de-identified] : XUAN ROJAS is a 31 year female being seen for first POA L hip arthroscopy, femoroplasty, acetabuloplasty, labral repair, 2 weeks ago. XUAN presents today wearing hip yuri. She denies fever chills, redness around/near incision site and numbness/tingling. She denies nausea/vomiting and admits to their appetite being back to normal since surgery. She has started PT at this time ([facility]).  [de-identified] : Left hip \par \par Incision sites are clean dry and intact.  No surrounding erythema. No drainage.  Range of motion 0-90° with no pain. Motor and sensation are intact distally.  She does not have numbness over the pudendal area.  [de-identified] : 2 views of L hip were performed today and available for me to review. Results were discussed with the patient. They demonstrate no f/x, dislocation or other deformity, no CAM lesion.\par  [de-identified] : XUAN ROJAS is a 31 year y/o female who presents for SPO Left hip arthroscopy, labral repair, femoral plasty, acetabuloplasty, 2 wks ago.  She will continue PT 2x/week x 4 weeks until we see her again for clinical reassessment with use of his brace at all times over the next 2 weeks and use of his crutches. Her brace was unlocked to 90 degrees at today's visit. After 3 weeks she will wean off the crutches and brace as tolerated with PT. A prescription of Diclofenac was given and patient was informed about its risks and benefits. She agrees with the above plan and all questions were answered.  \par \par The question of when to drive is impossible to generalize to everyone because it is largely dependent on the individual.  Importantly, Doctors do not have a license with the DMV to "clear you" or "release you" to return to driving.  There are 3 primary criteria that must be met.  You need to be off of narcotic pain medicines for greater than 24 hours (otherwise you are driving under the influence).  You need to be able to get in and out of the drivers seat comfortably and without pain.  And you must have regained your normal reflexes / strength.  Also, return to driving depends partly on what side had injury and/or surgery (ie. Right leg operates the pedals; people with Left side surgery can generally get back to driving much sooner unless you have a clutch).  The average time to return to driving is around 2 weeks after you return to normal walking for the right side and usually sooner for the left.  We recommend 'testing' yourself with another licensed  in an empty parking lot or quiet street first in order to check your reflexes moving your foot from pedal to pedal.

## 2022-05-13 ENCOUNTER — APPOINTMENT (OUTPATIENT)
Dept: ORTHOPEDIC SURGERY | Facility: HOSPITAL | Age: 32
End: 2022-05-13

## 2022-06-05 ENCOUNTER — NON-APPOINTMENT (OUTPATIENT)
Age: 32
End: 2022-06-05

## 2022-06-06 ENCOUNTER — RX RENEWAL (OUTPATIENT)
Age: 32
End: 2022-06-06

## 2022-06-08 ENCOUNTER — APPOINTMENT (OUTPATIENT)
Dept: ORTHOPEDIC SURGERY | Facility: CLINIC | Age: 32
End: 2022-06-08
Payer: MEDICAID

## 2022-06-08 PROBLEM — F07.81 POSTCONCUSSIONAL SYNDROME: Chronic | Status: ACTIVE | Noted: 2022-04-19

## 2022-06-08 PROBLEM — E04.9 NONTOXIC GOITER, UNSPECIFIED: Chronic | Status: ACTIVE | Noted: 2022-04-19

## 2022-06-08 PROBLEM — M51.26 OTHER INTERVERTEBRAL DISC DISPLACEMENT, LUMBAR REGION: Chronic | Status: ACTIVE | Noted: 2022-04-19

## 2022-06-08 PROBLEM — M25.559 PAIN IN UNSPECIFIED HIP: Chronic | Status: ACTIVE | Noted: 2022-04-19

## 2022-06-08 PROBLEM — S83.209A UNSPECIFIED TEAR OF UNSPECIFIED MENISCUS, CURRENT INJURY, UNSPECIFIED KNEE, INITIAL ENCOUNTER: Chronic | Status: ACTIVE | Noted: 2022-04-19

## 2022-06-08 PROBLEM — F41.9 ANXIETY DISORDER, UNSPECIFIED: Chronic | Status: ACTIVE | Noted: 2022-04-19

## 2022-06-08 PROBLEM — Z87.898 PERSONAL HISTORY OF OTHER SPECIFIED CONDITIONS: Chronic | Status: ACTIVE | Noted: 2022-04-19

## 2022-06-08 PROBLEM — M75.100 UNSPECIFIED ROTATOR CUFF TEAR OR RUPTURE OF UNSPECIFIED SHOULDER, NOT SPECIFIED AS TRAUMATIC: Chronic | Status: ACTIVE | Noted: 2022-04-19
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
## 2022-06-08 PROCEDURE — 99024 POSTOP FOLLOW-UP VISIT: CPT

## 2022-06-08 NOTE — HISTORY OF PRESENT ILLNESS
[___ Weeks Post Op] : [unfilled] weeks post op [2] : the patient reports pain that is 2/10 in severity [Clean/Dry/Intact] : clean, dry and intact [Neuro Intact] : an unremarkable neurological exam [Vascular Intact] : ~T peripheral vascular exam normal [Doing Well] : is doing well [Chills] : no chills [Fever] : no fever [Nausea] : no nausea [Vomiting] : no vomiting [Discharge] : absent of discharge [Dehiscence] : not dehisced [de-identified] : spo L hip arthroscopy, femoroplasty, acetabuloplasty, labral repair. DOS: 04/25/2021 [de-identified] : XUAN ROJAS is a 31 year female being seen for POA L hip arthroscopy, femoroplasty, acetabuloplasty, labral repair, 7 weeks ago. XUAN presents today wearing hip yuri. She denies fever chills, redness around/near incision site and numbness/tingling. She denies nausea/vomiting and admits to their appetite being back to normal since surgery. She has started PT at this time ([facility]).  [de-identified] : Left hip \par \par Incision sites are clean dry and intact.  No surrounding erythema. No drainage.  Range of motion 0-90° with no pain internal or external rotation.  She can straight leg raise against gravity with weakness. Motor and sensation are intact distally.  She does not have numbness over the pudendal area.  [de-identified] : No new imaging performed today. [de-identified] : 31-year-old female status post left hip arthroscopy with labral repair and related procedures [de-identified] : I had a lengthy discussion with the patient regarding her condition.  We discussed the treatment plan.  At this time she is to continue with formal physical therapy.  She can discontinue her brace.  She may weight-bear as tolerated.  I cautioned her against doing too much too soon.  She will follow-up in 6 weeks.  All questions were answered.

## 2022-07-11 ENCOUNTER — APPOINTMENT (OUTPATIENT)
Dept: ORTHOPEDIC SURGERY | Facility: CLINIC | Age: 32
End: 2022-07-11

## 2022-07-11 DIAGNOSIS — M54.10 RADICULOPATHY, SITE UNSPECIFIED: ICD-10-CM

## 2022-07-11 DIAGNOSIS — R10.30 LOWER ABDOMINAL PAIN, UNSPECIFIED: ICD-10-CM

## 2022-07-11 PROCEDURE — 99024 POSTOP FOLLOW-UP VISIT: CPT

## 2022-07-11 NOTE — ADDENDUM
[FreeTextEntry1] : Documented by Gold Granados acting as a scribe for Dr. Lewis and Rivera Traore PA-C on 07/11/2022. \par \par All medical record entries made by the Scribe were at my, Dr. Lewis, and Rivera Traore's, direction and\par personally dictated by me on 07/11/2022. I have reviewed the chart and agree that the record\par accurately reflects my personal performance of the history, physical exam, procedure and imaging.

## 2022-07-11 NOTE — HISTORY OF PRESENT ILLNESS
[___ Weeks Post Op] : [unfilled] weeks post op [2] : the patient reports pain that is 2/10 in severity [Clean/Dry/Intact] : clean, dry and intact [Neuro Intact] : an unremarkable neurological exam [Vascular Intact] : ~T peripheral vascular exam normal [Doing Well] : is doing well [Chills] : no chills [Fever] : no fever [Nausea] : no nausea [Vomiting] : no vomiting [Discharge] : absent of discharge [Dehiscence] : not dehisced [de-identified] : spo L hip arthroscopy, femoroplasty, acetabuloplasty, labral repair. DOS: 04/25/2021 [de-identified] : XUAN ROJAS is a 31 year female being seen for POA L hip arthroscopy, femoroplasty, acetabuloplasty, labral repair, 12 weeks ago.  She denies fever chills, redness around/near incision site and numbness/tingling. She denies nausea/vomiting and admits to their appetite being back to normal since surgery. She has been going to PT 2x/week. Few days ago, patient felt instability in hip joint, and has been feeling pain in her inguinal region since then. She has been doing PT, and HEP at this time.  [de-identified] : Left hip \par \par Incision sites are clean dry and intact.  No surrounding erythema. No drainage.  Range of motion 0-100°, IR to 10 with pain over lateral hip. TTP GT. She can straight leg raise against gravity with weakness. Motor and sensation are intact distally.  She does not have numbness over the pudendal area.  [de-identified] : No new imaging performed today. [de-identified] : XUAN is a 31 year female who is doing extremely well and is performing PT 2x/week without complaints. Surgical sites are clean and dry without warmth or erythema, pt denies fever or chills. Patient recently had instability event. On clinical exam, most of her pain stems from gleutus and hip flexor weakness for which I advised patient to continue PT, and HEP. She may use crutches as tolerated. She will continue using mobic at this time. Patient will follow up in 4-6 wks for repeat clinical assessment. All questions were answered and the patient verbalized understanding. The patient is in agreement with this treatment plan.

## 2022-07-11 NOTE — BEGINNING OF VISIT
Roomed by Trice SHEIKHV: 6/25/18    HPI: 71 year old male presents for a check of growths on the left shoulder and left shin, present > 1 day.    No other symptoms, aggravating factors, or treatments noted.  ROS: besides positives in HPI all other systems negative.  Pt has No Known Allergies. The medication list was reviewed today. Pt reports that he does not drink alcohol. Pt reports that he has never smoked. He does not have any smokeless tobacco history on file. family history includes Asthma in his daughter; Cancer in his maternal grandmother and mother; Heart in his father.    PE: General: alert, WDWN, no respiratory distress, normal affect  Eyes: no injection  Focused skin exam significant for: approximately 1cm pink patch located on the left shoulder; see photo of left shin  Face(including eyelids and lips)/Neck/Hands: no additional lesions that appear malignant    A/P:  1. Neoplasm of uncertain nature of skin  - previously undiagnosed new problem to provider with uncertain prognosis and additional work-up planned (see below)  - performed skin biopsy today (see procedure note below)    2. Neoplasm of uncertain nature of skin  - previously undiagnosed new problem to provider with uncertain prognosis and additional work-up planned (see below)  - performed skin biopsy today (see procedure note below)    PROCEDURE NOTE: Tangential biopsy with shave technique of a neoplasm of uncertain nature of skin  DIFFERENTIAL DIAGNOSIS: r/o nonmelanoma skin cancer  LOCATION: left shoulder    After discussing the risks, the procedure was consented to.  The surgical area was cleaned with alcohol, and anesthetized with 0.5cc of 1% Xylocaine with epinephrine.  A biopsy was taken with the use of a blade.  The specimen was placed in formalin, visualized in container, and sent to pathology.  Hemostasis was obtained.  The surgical site was bandaged.  Aftercare was discussed.  Patient tolerated procedure well.    PROCEDURE NOTE:  Tangential biopsy with shave technique of a neoplasm of uncertain nature of skin  DIFFERENTIAL DIAGNOSIS: r/o nonmelanoma skin cancer  LOCATION: left shin    After discussing the risks, the procedure was consented to.  The surgical area was cleaned with alcohol, and anesthetized with 0.5cc of 1% Xylocaine with epinephrine.  A biopsy was taken with the use of a blade.  The specimen was placed in formalin, visualized in container, and sent to pathology.  Hemostasis was obtained.  The surgical site was bandaged.  Aftercare was discussed.  Patient tolerated procedure well.    RTC: TBD based on pathology results    Electronically Signed by: Wagner Scott MD, 5/6/2019     [Patient] : patient

## 2022-08-01 ENCOUNTER — APPOINTMENT (OUTPATIENT)
Dept: ORTHOPEDIC SURGERY | Facility: CLINIC | Age: 32
End: 2022-08-01

## 2022-08-08 ENCOUNTER — APPOINTMENT (OUTPATIENT)
Dept: ORTHOPEDIC SURGERY | Facility: CLINIC | Age: 32
End: 2022-08-08

## 2022-09-27 ENCOUNTER — APPOINTMENT (OUTPATIENT)
Dept: ORTHOPEDIC SURGERY | Facility: CLINIC | Age: 32
End: 2022-09-27

## 2022-09-27 DIAGNOSIS — S73.192A OTHER SPRAIN OF LEFT HIP, INITIAL ENCOUNTER: ICD-10-CM

## 2022-09-27 DIAGNOSIS — M25.552 PAIN IN LEFT HIP: ICD-10-CM

## 2022-09-27 PROCEDURE — 99213 OFFICE O/P EST LOW 20 MIN: CPT

## 2022-09-28 PROBLEM — S73.192A TEAR OF LEFT ACETABULAR LABRUM, INITIAL ENCOUNTER: Status: ACTIVE | Noted: 2021-07-22

## 2022-09-28 PROBLEM — M25.552 LEFT HIP PAIN: Status: ACTIVE | Noted: 2021-07-06

## 2022-09-28 NOTE — HISTORY OF PRESENT ILLNESS
[Improving] : improving [___ mths] : [unfilled] month(s) ago [0] : an average pain level of 0/10 [de-identified] : XUAN ROJAS is a 31 year y/o female who presents for f/u visit SPO L hip arthroscopy, femoroplasty, acetabuloplasty, labral repair. DOS: 04/25/2022. Patient is doing very well at this time. Denies any pain. She complains of occasional back pain.

## 2022-09-28 NOTE — ADDENDUM
[FreeTextEntry1] : Documented by Carol Tello acting as a scribe for Dr. Lewis and Rivera Traore PA-C on 09/27/2022. \par \par All medical record entries made by the Scribe were at my, Dr. Lewis, and Rivera Traore's, direction and\par personally dictated by me on 09/27/2022. I have reviewed the chart and agree that the record\par accurately reflects my personal performance of the history, physical exam, procedure and imaging.

## 2022-09-28 NOTE — REVIEW OF SYSTEMS
[Negative] : Musculoskeletal [FreeTextEntry9] : SPO L hip arthroscopy, femoroplasty, acetabuloplasty, labral repair. DOS: 04/25/2021.

## 2022-11-29 ENCOUNTER — APPOINTMENT (OUTPATIENT)
Dept: ORTHOPEDIC SURGERY | Facility: CLINIC | Age: 32
End: 2022-11-29
Payer: MEDICAID

## 2022-11-29 VITALS
HEART RATE: 85 BPM | SYSTOLIC BLOOD PRESSURE: 126 MMHG | BODY MASS INDEX: 28.32 KG/M2 | DIASTOLIC BLOOD PRESSURE: 81 MMHG | WEIGHT: 170 LBS | HEIGHT: 65 IN

## 2022-11-29 PROCEDURE — 73110 X-RAY EXAM OF WRIST: CPT | Mod: RT

## 2022-11-29 PROCEDURE — 20605 DRAIN/INJ JOINT/BURSA W/O US: CPT | Mod: 59,RT

## 2022-11-29 PROCEDURE — 20550 NJX 1 TENDON SHEATH/LIGAMENT: CPT | Mod: 59,RT

## 2022-11-29 PROCEDURE — 99214 OFFICE O/P EST MOD 30 MIN: CPT | Mod: 25

## 2022-11-29 NOTE — ASSESSMENT
[FreeTextEntry1] : ASSESSMENT:\par \par The patient comes in today with chronic multiple year history of significant right wrist and right elbow pain in the form of tendinosis and ulnar-sided wrist pain.  This is all significantly aggravating her\par [I have diagnosed the patient today with a new diagnosis.]\par [This is likely to diminish bodily function for the extremity.] \par \par [The patient was given an injection today.]\par Injection:\par \par The risks and benefits of a steroid injection were discussed in detail. The risks include but are not limited to: pain, infection, swelling, flare response, bleeding, subcutaneous fat atrophy, skin depigmentation and/or elevation of blood sugar. The risk of incomplete resolution of symptoms, recurrence and additional intervention was reviewed and considered by the patient. \par The patient agreed to proceed and under a sterile prep, I injected 2 cc of a combination of Celestone and Lidocaine into the right elbow lateral epicondyle, right wrist first dorsal compartment, right ulnar-sided wrist TFC as 3 separate injections. The patient tolerated the injection well.\par \par She was adequately and thoroughly informed of my assessment of their current condition(s). \par \par \par \par

## 2022-11-29 NOTE — HISTORY OF PRESENT ILLNESS
[FreeTextEntry1] : Poonam is a pleasant 32-year-old female who presents today with significant complaints of right wrist pain and right elbow pain.  She suffered an injury 5 years prior and since then she has been suffering from these complaints.

## 2022-11-29 NOTE — CONSULT LETTER
[Dear  ___] : Dear  [unfilled], [Courtesy Letter:] : I had the pleasure of seeing your patient, [unfilled], in my office today. [Please see my note below.] : Please see my note below. [Sincerely,] : Sincerely, [FreeTextEntry2] : Data Haleigh RAMOS

## 2022-11-29 NOTE — PHYSICAL EXAM
[de-identified] : General Exam:\par \par [The patient is alert and oriented, is well appearing, and in no apparent distress]\par [Cervical spine mobility is full in all directions]\par [There are no apparent skin lesions, ulcers, or masses]\par [Inspection of the extremities shows no signs of skin changes or muscle asymmetry]\par \par Examination of the right elbow reveals significant pain at the level of the lateral epicondyle with pain upon resisted wrist and finger extension.\par \par Examination of the right wrist reveals significant tenderness overlying the first dorsal compartment with a positive Finkelstein.  She also has significant tenderness overlying the ulnar wrist.  She has tenderness with compression of the triquetrum.  She has discomfort with LT shuck testing.  She has pain with ends of pronation and supination on the ulnar wrist.  She has mild discomfort with ulnar grinding.  She has significant foveal tenderness as well as dorsal tenderness.  The DRUJ and the ECU both appear stable [de-identified] : [4] views of bilateral hands and wrists were obtained today in my office and were seen by me and discussed with the patient. \par These show findings consistent with grossly preserved radiocarpal joint spaces.  I do not see significant ulnar positivity in these x-rays\par

## 2022-11-29 NOTE — DISCUSSION/SUMMARY
[FreeTextEntry1] : 1.  I am hopeful that the injection to the right elbow at the lateral epicondyle as well as the right wrist at the first dorsal compartment will help relieve her tendinopathy symptoms\par \par #2 I am also hopeful that the injection to the right wrist at the ulnar aspect will help improve her significant TFCC and adjacent discomfort.\par \par #3 she has elected to wear a right wrist brace that should help with all of her symptoms.\par \par #4 I would like to see her back in 8 weeks time

## 2022-11-29 NOTE — REVIEW OF SYSTEMS
[Joint Pain] : joint pain [Joint Stiffness] : joint stiffness [Joint Swelling] : joint swelling [Negative] : Allergic/Immunologic [FreeTextEntry9] : right wrist pain

## 2022-12-21 ENCOUNTER — APPOINTMENT (OUTPATIENT)
Dept: ORTHOPEDIC SURGERY | Facility: CLINIC | Age: 32
End: 2022-12-21

## 2023-01-20 NOTE — H&P PST ADULT - VENOUS THROMBOEMBOLISM
Patient : García Valdes Age: 72 year old Sex: male   MRN: [id number] Encounter Date: 1/19/2023      History     No chief complaint on file.    HPI    No Known Allergies    Current Discharge Medication List      Prior to Admission Medications    Details   INV - iron sucrose,VENOFER, (REPAIR-SHIVA) 200 mg.      apixaBAN (ELIQUIS) 2.5 MG Tab Take 1 tablet by mouth.      gentamicin (GARAMYCIN) 0.1 % cream APPLY A SMALL AMOUNT TO THE AFFECTED AREA DAILY AS DIRECTED      blood glucose test strip       Methoxy PEG-Epoetin Beta (MIRCERA IJ) Inject 200 mcg into the skin.      pramipexole (Mirapex) 1 MG tablet Take 1.5 tablets by mouth at bedtime.  Qty: 135 tablet, Refills: 3      cloNIDine (CATAPRES) 0.2 MG tablet Take 1 tablet by mouth in the morning and 1 tablet in the evening.  Qty: 180 tablet, Refills: 3      terazosin (HYTRIN) 2 MG capsule Take 1 capsule by mouth in the morning and 1 capsule in the evening. TAKE WITH 5 MG CAPSULE TO EQUAL 7 MG TWICE A DAY  Qty: 180 capsule, Refills: 1      allopurinol (ZYLOPRIM) 100 MG tablet Take 1 tablet by mouth daily.  Qty: 90 tablet, Refills: 0      terazosin (HYTRIN) 5 MG capsule Take 1 capsule by mouth in the morning and 1 capsule in the evening.  Qty: 180 capsule, Refills: 1      losartan (COZAAR) 25 MG tablet Take 1 tablet by mouth at bedtime.  Qty: 90 tablet, Refills: 0      sevelamer carbonate (RENVELA) 800 MG tablet TAKE 2 TABLETS BY MOUTH THREE TIMES A DAY WITH MEALS AND 1 TABLET DAILY WITH A SNACK      HYDROcodone-acetaminophen (NORCO) 5-325 MG per tablet Take 1 tablet by mouth every 6 hours as needed for Pain.  Qty: 20 tablet, Refills: 0      Lidocaine 4 % Gel Apply 1 application topically 3 times daily.  Qty: 113 g, Refills: 1      amLODIPine (NORVASC) 10 MG tablet Take 1 tablet by mouth daily.  Qty: 90 tablet, Refills: 1      Heparin Sodium, Porcine, (heparin, porcine,) 5000 UNIT/ML injectable solution Inject 1,000 Units into the skin daily.      B Complex-C-Folic Acid  (Mayelin-Ana) Tab Take 1 tablet by mouth daily.      bumetanide (BUMEX) 2 MG tablet Take 1 tablet by mouth 2 times daily.  Qty: 90 tablet, Refills: 3      ZINC SULFATE PO       Coenzyme Q10 (CO Q-10) 300 MG Cap Take 100 mg by mouth 3 times daily.      Omega-3 Fatty Acids (FISH OIL) 1000 MG capsule Take 2,400 g by mouth 2 times daily.       cholecalciferol (VITAMIN D3) 1000 UNIT tablet Take 2,000 Units by mouth daily.       Multiple Vitamin (MULTIVITAMINS PO) Take 1 tablet by mouth daily.              Past Medical History:   Diagnosis Date   • CAD (coronary artery disease)    • Chronic lymphocytic leukemia (CMS/HCC)    • Chronic rhinitis    • Dialysis patient (CMS/HCC)     Will start on 07/07/2022 [facility]: Monday/Wednesday/Friday   • DM (diabetes mellitus) (CMS/[facility])     Type 2 diagnosed ~ 2011   • Gout    • HLD (hyperlipidemia)    • HTN (hypertension)    • Hypercalcuria    • Hyperparathyroidism 12/15/2004    s/p single adenoma resected   • Morbid obesity (CMS/[facility])    • Osteopenia    • Sleep apnea     uses CPAP   • Thyroid condition    • Wears prescription eyeglasses        Past Surgical History:   Procedure Laterality Date   • EXCIS LESN TENDON SHEALTH LEG/ANKLE  01/01/1972    right ankle tendon repair   • HERNIA REPAIR  03/01/2022    OPEN UMBILICAL HEARNIA REPAIR WITH MESH with LAPAROSCOPIC PERITONELA DIALYSIS CATHETER by Dr. Maddox at [facility]   • PARATHYROIDECTOMY  12/15/2004    Solitary right inferior parathyroid adenoma resected, wighing 410 mg.   • PERITONEAL CATHETER INSERTION  03/01/2022    LAPAROSCOPIC PERITONELA DIALYSIS CATHETER with OPEN UMBILICAL HEARNIA REPAIR WITH MESH by Dr. Maddox at [facility]   • PERITONEAL CATHETER INSERTION  08/09/2022   • PERITONEAL CATHETER REMOVAL  07/06/2022    Dr. Maddox   • REPAIR OF NASAL SEPTUM  09/09/2007    Septoplasty/ Bilateral nasal turbinates   • UPPER ARM/ELBOW SURGERY UNLISTED  01/01/1972    Unspecified Right Elbow Procedure       Family History   Problem  Relation Age of Onset   • Other Mother         Brain Tumor    • Myocardial Infarction Father         CAD   • Heart disease Father    • Leukemia Sister         currently in remission   • Obesity Daughter    • Diabetes Daughter         on insulin   • Obesity Daughter    • Stroke Maternal Grandmother        Social History     Tobacco Use   • Smoking status: Never   • Smokeless tobacco: Never   Vaping Use   • Vaping Use: never used   Substance Use Topics   • Alcohol use: Yes     Alcohol/week: 5.0 - 10.0 standard drinks     Types: 5 - 10 Standard drinks or equivalent per week     Comment: 1-2 beer 5xweek   • Drug use: No       E-cigarette/Vaping   • E-Cigarette/Vaping Use Never Used    • Passive Exposure No    • Counseling Given No      E-Cigarette/Vaping Substances & Devices   • Nicotine No    • THC No    • CBD No    • Flavoring No    • Disposable No    • Pre-filled or Refillable Cartridge No    • Refillable Tank No    • Pre-filled Pod No        Review of Systems    Physical Exam     ED Triage Vitals [01/19/23 1905]   ED Triage Vitals Group      Temp       Heart Rate 72      Resp       BP (!) 151/67      SpO2 96 %      EtCO2 mmHg       Height       Weight       Weight Scale Used       BMI (Calculated)       IBW/kg (Calculated)        Physical Exam    ED Course     Procedures    Lab Results     No results found for this visit on 01/19/23.    EKG Results     EKG Interpretation  Rate: ***  Rhythm: {rhythm:57254}   Abnormality: {ABNORMAL:072539}    EKG tracing interpreted by ED physician    Radiology Results     Imaging Results    None         ED Medication Orders (From admission, onward)    None               MDM    Clinical Impression     No diagnosis found.    Disposition        There is no disposition no dispo time  There is no comment     no

## 2023-01-25 ENCOUNTER — APPOINTMENT (OUTPATIENT)
Dept: ORTHOPEDIC SURGERY | Facility: CLINIC | Age: 33
End: 2023-01-25
Payer: MEDICAID

## 2023-01-25 DIAGNOSIS — M46.1 SACROILIITIS, NOT ELSEWHERE CLASSIFIED: ICD-10-CM

## 2023-01-25 PROCEDURE — 99214 OFFICE O/P EST MOD 30 MIN: CPT

## 2023-01-25 PROCEDURE — 72100 X-RAY EXAM L-S SPINE 2/3 VWS: CPT

## 2023-01-26 ENCOUNTER — APPOINTMENT (OUTPATIENT)
Dept: PHYSICAL MEDICINE AND REHAB | Facility: CLINIC | Age: 33
End: 2023-01-26

## 2023-01-26 ENCOUNTER — APPOINTMENT (OUTPATIENT)
Dept: ORTHOPEDIC SURGERY | Facility: CLINIC | Age: 33
End: 2023-01-26
Payer: MEDICAID

## 2023-01-26 VITALS
DIASTOLIC BLOOD PRESSURE: 74 MMHG | WEIGHT: 170 LBS | HEIGHT: 65 IN | HEART RATE: 144 BPM | BODY MASS INDEX: 28.32 KG/M2 | SYSTOLIC BLOOD PRESSURE: 104 MMHG

## 2023-01-26 DIAGNOSIS — M25.521 PAIN IN RIGHT ELBOW: ICD-10-CM

## 2023-01-26 DIAGNOSIS — M25.531 PAIN IN RIGHT WRIST: ICD-10-CM

## 2023-01-26 PROCEDURE — 99213 OFFICE O/P EST LOW 20 MIN: CPT

## 2023-01-26 NOTE — PHYSICAL EXAM
[de-identified] : General Exam:\par She is well-appearing on examination\par Examination of her right elbow as well as her right wrist reveal full range of motion without any discomfort.

## 2023-01-26 NOTE — HISTORY OF PRESENT ILLNESS
[FreeTextEntry1] : 32 y.o. F w/ left lateral hip, groin and thigh pain returns to office for f/u.  Patient last seen February 2021.  Her interval orthopedic history is significant for a left hip arthroscopy, femoroplasty, acetabuloplasty and labral repair (4/25/2022).  Since her surgery, the patient reports

## 2023-01-26 NOTE — HISTORY OF PRESENT ILLNESS
[FreeTextEntry1] : Poonam returns for follow-up.  She tells me that her symptoms have improved dramatically.

## 2023-01-26 NOTE — ASSESSMENT
[FreeTextEntry1] : 32 y.o. F w/ left lateral hip, groin and thigh pain of unclear etiology.  Clinical suspicion for left hip impingement despite negative radiographic correlates.  MRI L-spine unremarkable.  No indication for spinal injection.  Left adductor longus CSI did not provide significant relief (although was not given under US guidance) and patient's pain is more diffuse involving the posterolateral hip/thigh.  No clinical concern for piriformis syndrome -> sciatic nerve irritation.  EDX studies may be considered.  Discussed R/B/A to US guided LEFT HIP LA block w/o corticosteroid to help better determine pain generator.  If + block, would obtain dedicated MRI Left Hip and consideration of arthroscopy.  Will discuss case w/ Dr. Aranda.

## 2023-01-26 NOTE — ASSESSMENT
[FreeTextEntry1] : I am delighted to see that Kendra is doing so well and so is she.  At this point in time progress with activities of daily living as tolerated.  She should come and see me if her symptoms do return or if she has any questions or concerns.

## 2023-01-27 NOTE — REVIEW OF SYSTEMS
[Negative] : Heme/Lymph [FreeTextEntry9] : SPO L hip arthroscopy, femoroplasty, acetabuloplasty, labral repair. DOS: 04/25/2021.

## 2023-01-27 NOTE — DISCUSSION/SUMMARY
[de-identified] : I had a lengthy discussion with the patient regarding their current condition. We discussed the treatment options including operative and nonoperative management. At this time I recommended conservative management.  Giving her prescription for Voltaren gel, its use and side effects were discussed.  I referred her to Dr. Vail for evaluation for possible lumbar radiculopathy versus sacroiliitis.  She will follow-up here as needed.  All questions were answered.

## 2023-01-27 NOTE — PHYSICAL EXAM
[de-identified] : Physical Exam:\par General: Well appearing, no acute distress\par Neurologic: A&Ox3, No focal deficits\par Head: NCAT without abrasions, lacerations, or ecchymosis to head, face, or scalp\par Eyes: No scleral icterus, no gross abnormalities\par Respiratory: Equal chest wall expansion bilaterally, no accessory muscle use\par Lymphatic: No lymphadenopathy palpated\par Skin: Warm and dry\par Psychiatric: Normal mood and affect \par \par Left Hip:\par No tenderness to palpation over the greater troches.  Range of motion 0-105°, IR to 30 with pain over lateral hip. She can straight leg raise against gravity with weakness. Motor and sensation are intact distally. She does not have numbness over the pudendal area. \par \par Thoracolumbar spine is examined and reveals no deformity. Patient has no midline tenderness to palpation. The patient has left lumbar paraspinal tenderness, and tenderness in the left SI joint. Trunk flexion to 60 degrees. Bilateral trunk rotation to 25 degrees. Bilateral side bending to 25 degrees. Extension to 10 degrees. No pain with extension. Patient has pain with straight leg raising but negative straight leg raise signs bilaterally. Straight leg raise is to 35 degrees bilaterally. No pain with logrolling either hip. No pain with hip flexion, IR/ER. Negative LISANDRO and FADIR tests, as well as negative Figure 4 test. 5/5 motor strength to all major muscle groups of the bilateral lower extremity. Calves and thighs are soft and nontender bilaterally. 2+ DP pulses. [de-identified] : X-rays including 2 views of the lumbosacral spine taken today in the office are reviewed.  These reveal no evidence of fracture or acute bony injury.  Disc heights are well-maintained.

## 2023-01-27 NOTE — HISTORY OF PRESENT ILLNESS
[Stable] : stable [___ mths] : [unfilled] month(s) ago [1] : an average pain level of 1/10 [0] : a minimum pain level of 0/10 [2] : a maximum pain level of 2/10 [de-identified] : XUAN ROJAS is a 31 year y/o female who presents for f/u visit SPO L hip arthroscopy, femoroplasty, acetabuloplasty, labral repair, 9 months ago. She admits to some pinching in her L groin, and some lower back pain.  Complains of deep buttock pain.  She denies radicular symptoms or any change in bowel or bladder.  Otherwise, no complaints at this time. She admits to some discomfort with change of weather.

## 2023-02-13 ENCOUNTER — APPOINTMENT (OUTPATIENT)
Dept: ORTHOPEDIC SURGERY | Facility: CLINIC | Age: 33
End: 2023-02-13
Payer: COMMERCIAL

## 2023-02-13 VITALS — BODY MASS INDEX: 29.99 KG/M2 | HEIGHT: 65 IN | WEIGHT: 180 LBS

## 2023-02-13 PROCEDURE — 72100 X-RAY EXAM L-S SPINE 2/3 VWS: CPT

## 2023-02-13 PROCEDURE — 99214 OFFICE O/P EST MOD 30 MIN: CPT

## 2023-02-13 PROCEDURE — 73503 X-RAY EXAM HIP UNI 4/> VIEWS: CPT

## 2023-02-13 PROCEDURE — 99072 ADDL SUPL MATRL&STAF TM PHE: CPT

## 2023-02-13 NOTE — PHYSICAL EXAM
[de-identified] : Physical Exam: \par General: Well appearing, no acute distress \par Neurologic: A&Ox3, No focal deficits \par Head: NCAT without abrasions, lacerations, or ecchymosis to head, face, or scalp \par Eyes: No scleral icterus, no gross abnormalities \par Respiratory: Equal chest wall expansion bilaterally, no accessory muscle use \par Lymphatic: No lymphadenopathy palpated \par Skin: Warm and dry \par Psychiatric: Normal mood and affect \par \par Examination of the Left hip reveals no obvious deformity or leg length discrepancy. There is no swelling noted. The patient is nontender to palpation over the greater trochanter, groin, and IT band. The patients range of motion is to 110 degrees of hip flexion, 40 degrees of abduction, 20 degrees of adduction, 15 degrees of internal rotation and 35 degrees of external rotation. The patient has 5/5 strength to resisted hip flexion, abduction and adduction. The patient has a positive LISANDRO with anterior pain and positive FADIR Test. Positve Herrera Test. Positive resisted SLR test at 30 degrees of hip flexion (Former[facility]). Negative Piriformis Test. No SI joint instability. There is no pain with logrolling. The calf and thigh are soft and nontender bilaterally. The patient is grossly neurovascularly intact distally.  [de-identified] : 2 views of L hip were performed today and available for me to review. Results were discussed with the patient. They demonstrate no f/x, dislocation or other deformity.\par \par 3 views of pelvis were performed today and available for me to review. Results were discussed with the patient. They demonstrate no f/x, dislocation or other deformity.\par \par 2 views lumbar spine were performed today and available for me to review. Results were discussed with the patient. They demonstrate no f/x, dislocation or other deformity.\par
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
## 2023-02-13 NOTE — ADDENDUM
[FreeTextEntry1] : Documented by Catherine Mathur acting as a scribe for Dr. Lewis and Rivera Traore PA-C on 02/13/2023.   All medical record entries made by the Scribe were at my, Dr. Lewis, and Rivera Traore's, direction and personally dictated by me on 02/13/2023. I have reviewed the chart and agree that the record accurately reflects my personal performance of the history, physical exam, procedure and imaging.

## 2023-02-13 NOTE — DISCUSSION/SUMMARY
[de-identified] : We had a thorough discussion regarding the nature of her pain, the pathophysiology, as well as all treatment options. A prescription of methylprednisolone was given and patient was informed about its risks and benefits. \par \par Considering the patient's current presentation of pain, physical exam, and radiographs an MRI of the back and left hip is indicated at this time. A prescription for this was given to the patient. We will go over the MRI results with her upon obtaining the results in the office and advise her of further treatment options. She agrees with the above plan and all questions were answered.
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
## 2023-02-13 NOTE — REVIEW OF SYSTEMS
[Joint Pain] : joint pain [FreeTextEntry9] : spo L hip arthroscopy, femoroplasty, acetabuloplasty, labral repair, 04/25/2021.

## 2023-02-16 ENCOUNTER — APPOINTMENT (OUTPATIENT)
Dept: PHYSICAL MEDICINE AND REHAB | Facility: CLINIC | Age: 33
End: 2023-02-16

## 2023-02-21 ENCOUNTER — NON-APPOINTMENT (OUTPATIENT)
Age: 33
End: 2023-02-21

## 2023-02-23 ENCOUNTER — OUTPATIENT (OUTPATIENT)
Dept: OUTPATIENT SERVICES | Facility: HOSPITAL | Age: 33
LOS: 1 days | End: 2023-02-23
Payer: COMMERCIAL

## 2023-02-23 ENCOUNTER — APPOINTMENT (OUTPATIENT)
Dept: MRI IMAGING | Facility: CLINIC | Age: 33
End: 2023-02-23

## 2023-02-23 ENCOUNTER — RESULT REVIEW (OUTPATIENT)
Age: 33
End: 2023-02-23

## 2023-02-23 DIAGNOSIS — Z98.890 OTHER SPECIFIED POSTPROCEDURAL STATES: Chronic | ICD-10-CM

## 2023-02-23 DIAGNOSIS — M54.16 RADICULOPATHY, LUMBAR REGION: ICD-10-CM

## 2023-02-23 DIAGNOSIS — Z92.89 PERSONAL HISTORY OF OTHER MEDICAL TREATMENT: Chronic | ICD-10-CM

## 2023-02-23 DIAGNOSIS — K08.409 PARTIAL LOSS OF TEETH, UNSPECIFIED CAUSE, UNSPECIFIED CLASS: Chronic | ICD-10-CM

## 2023-02-23 PROCEDURE — 73721 MRI JNT OF LWR EXTRE W/O DYE: CPT | Mod: 26,LT

## 2023-02-23 PROCEDURE — 72148 MRI LUMBAR SPINE W/O DYE: CPT | Mod: 26
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
## 2023-03-14 ENCOUNTER — APPOINTMENT (OUTPATIENT)
Dept: ORTHOPEDIC SURGERY | Facility: CLINIC | Age: 33
End: 2023-03-14

## 2023-04-04 ENCOUNTER — APPOINTMENT (OUTPATIENT)
Dept: ORTHOPEDIC SURGERY | Facility: CLINIC | Age: 33
End: 2023-04-04
Payer: COMMERCIAL

## 2023-04-04 DIAGNOSIS — M25.852 OTHER SPECIFIED JOINT DISORDERS, LEFT HIP: ICD-10-CM

## 2023-04-04 DIAGNOSIS — M54.16 RADICULOPATHY, LUMBAR REGION: ICD-10-CM

## 2023-04-04 PROCEDURE — 99214 OFFICE O/P EST MOD 30 MIN: CPT

## 2023-04-04 NOTE — HISTORY OF PRESENT ILLNESS
[de-identified] : The patient is a 32 year old female presenting for an MRI review of the lumbar spine and left hip. She states that her symptoms remain unchanged since her previous visit. No other complaints.

## 2023-04-04 NOTE — PHYSICAL EXAM
[de-identified] : Physical Exam: \par General: Well appearing, no acute distress \par Neurologic: A&Ox3, No focal deficits \par Head: NCAT without abrasions, lacerations, or ecchymosis to head, face, or scalp \par Eyes: No scleral icterus, no gross abnormalities \par Respiratory: Equal chest wall expansion bilaterally, no accessory muscle use \par Lymphatic: No lymphadenopathy palpated \par Skin: Warm and dry \par Psychiatric: Normal mood and affect \par \par Examination of the Left hip reveals no obvious deformity or leg length discrepancy. There is no swelling noted. The patient is nontender to palpation over the greater trochanter, groin, and IT band. The patients range of motion is to 110 degrees of hip flexion, 40 degrees of abduction, 20 degrees of adduction, 15 degrees of internal rotation and 35 degrees of external rotation. The patient has 5/5 strength to resisted hip flexion, abduction and adduction. The patient has a positive LISANDRO with anterior pain and positive FADIR Test. Positve Herrera Test. Positive resisted SLR test at 30 degrees of hip flexion (Former[facility]). Negative Piriformis Test. No SI joint instability. There is no pain with logrolling. The calf and thigh are soft and nontender bilaterally. The patient is grossly neurovascularly intact distally.  [de-identified] : EXAM: [id number] - MR SPINE LUMBAR - ORDERED BY: REKHA PERRY\par PROCEDURE DATE: 02/23/2023\par \par IMPRESSION:\par 1. L5-S1: New central disc protrusion which mildly flattens the ventral thecal sac. No spinal canal or neural foraminal narrowing.\par \par \par EXAM: [id number] - MR HIP LT - ORDERED BY: REKHA PERRY\par PROCEDURE DATE: 02/23/2023\par \par IMPRESSION: \par 1. Status post anterosuperior left acetabular labral repair with anchor sutures in place. Mild granulation tissue is seen along the chondral labral junction of the left acetabular labrum. No fluid-filled labral tear.

## 2023-04-04 NOTE — DISCUSSION/SUMMARY
[de-identified] : We had a thorough discussion regarding the nature of her pain, the pathophysiology, as well as all treatment options. I have referred her to see Dr. Vail and Dr. Wood. She will follow up with me on an as needed basis. All questions were answered and the patient verbalized understanding. The patient is in agreement with this treatment plan.

## 2023-04-04 NOTE — ADDENDUM
[FreeTextEntry1] : Documented by Catherine Mathur acting as a scribe for Dr. Lewis and Rivera Traore PA-C on 04/04/2023.   All medical record entries made by the Scribe were at my, Dr. Lewis, and Rivera Traore's, direction and personally dictated by me on 04/04/2023. I have reviewed the chart and agree that the record accurately reflects my personal performance of the history, physical exam, procedure and imaging.

## 2023-05-02 ENCOUNTER — APPOINTMENT (OUTPATIENT)
Dept: ORTHOPEDIC SURGERY | Facility: CLINIC | Age: 33
End: 2023-05-02

## 2023-05-12 ENCOUNTER — TRANSCRIPTION ENCOUNTER (OUTPATIENT)
Age: 33
End: 2023-05-12

## 2023-11-17 NOTE — DISCUSSION/SUMMARY
[de-identified] : XUAN ROJAS is a 31 year y/o female who presents for f/u visit SPO L hip arthroscopy, femoroplasty, acetabuloplasty, labral repair. DOS: 04/25/2022. Patient is doing very well at this time. Denies any pain. She complains of occasional back pain. \par \par At this point, patient ROM has greatly improved, and her pain has subsided, she is doing well and happy with her progress so far. Concerning her back pain, referral to Dr. Vail given today. Patient will follow up in 6 months for repeat clinical assessment. All questions were answered and the patient verbalized understanding. The patient is in agreement with this treatment plan. 
0.5

## 2024-04-08 NOTE — REVIEW OF SYSTEMS
<-- Click to add NO pertinent Past Medical History [As Noted in HPI] : as noted in HPI [Abdominal Pain] : abdominal pain [Diarrhea] : diarrhea [Negative] : Heme/Lymph [Vomiting] : no vomiting [Heartburn] : no heartburn [Constipation] : no constipation [Melena] : no melena

## 2024-05-21 ENCOUNTER — APPOINTMENT (OUTPATIENT)
Dept: CARDIOLOGY | Facility: CLINIC | Age: 34
End: 2024-05-21
Payer: MEDICAID

## 2024-05-21 VITALS
DIASTOLIC BLOOD PRESSURE: 76 MMHG | HEART RATE: 98 BPM | BODY MASS INDEX: 35.16 KG/M2 | SYSTOLIC BLOOD PRESSURE: 110 MMHG | HEIGHT: 65 IN | OXYGEN SATURATION: 99 % | WEIGHT: 211 LBS

## 2024-05-21 DIAGNOSIS — R07.9 CHEST PAIN, UNSPECIFIED: ICD-10-CM

## 2024-05-21 DIAGNOSIS — Z78.9 OTHER SPECIFIED HEALTH STATUS: ICD-10-CM

## 2024-05-21 DIAGNOSIS — Z72.3 LACK OF PHYSICAL EXERCISE: ICD-10-CM

## 2024-05-21 DIAGNOSIS — F19.90 OTHER PSYCHOACTIVE SUBSTANCE USE, UNSPECIFIED, UNCOMPLICATED: ICD-10-CM

## 2024-05-21 DIAGNOSIS — R00.2 PALPITATIONS: ICD-10-CM

## 2024-05-21 PROCEDURE — 99204 OFFICE O/P NEW MOD 45 MIN: CPT | Mod: 25

## 2024-05-21 PROCEDURE — 93000 ELECTROCARDIOGRAM COMPLETE: CPT

## 2024-05-21 RX ORDER — CYCLOBENZAPRINE HYDROCHLORIDE 10 MG/1
10 TABLET, FILM COATED ORAL
Refills: 0 | Status: ACTIVE | COMMUNITY

## 2024-05-21 RX ORDER — GABAPENTIN 300 MG
300 TABLET ORAL 3 TIMES DAILY
Refills: 0 | Status: ACTIVE | COMMUNITY

## 2024-05-21 RX ORDER — MELOXICAM 15 MG/1
15 TABLET ORAL DAILY
Qty: 30 | Refills: 0 | Status: DISCONTINUED | COMMUNITY
Start: 2022-07-07 | End: 2024-05-21

## 2024-05-21 RX ORDER — IBUPROFEN 800 MG/1
800 TABLET, FILM COATED ORAL
Qty: 60 | Refills: 0 | Status: DISCONTINUED | COMMUNITY
Start: 2023-02-28 | End: 2024-05-21

## 2024-05-21 RX ORDER — METHYLPREDNISOLONE 4 MG/1
4 TABLET ORAL
Qty: 1 | Refills: 0 | Status: DISCONTINUED | COMMUNITY
Start: 2023-02-13 | End: 2024-05-21

## 2024-05-21 RX ORDER — DICLOFENAC SODIUM 75 MG/1
75 TABLET, DELAYED RELEASE ORAL
Qty: 60 | Refills: 0 | Status: DISCONTINUED | COMMUNITY
Start: 2022-05-10 | End: 2024-05-21

## 2024-05-22 NOTE — CARDIOLOGY SUMMARY
[de-identified] : Normal sinus rhythm [de-identified] : - Obesity: BMI 35 -ECHOCARDIOGRAM: 5/3/2024, outside office, reported EF 60%, trace mitral regurgitation and tricuspid regurgitation RVSP 10 -LE Doppler: Reported normal -EVENT MONITOR: 5/12/2024, [facility], 41 symptoms, 32 = SR, 5 = PVCs, 4 = APCs -CTA chest: Reported negative for PE
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
## 2024-05-22 NOTE — DISCUSSION/SUMMARY
[EKG obtained to assist in diagnosis and management of assessed problem(s)] : EKG obtained to assist in diagnosis and management of assessed problem(s) [FreeTextEntry1] : Palpitations/tachycardia Laboratory results requested.  Lower extremity Doppler and CTA chest results requested Holter monitor results requested Increase water intake Further recommendations after review of the above results  Dyspnea Not secondary to a cardiomyopathy or valvular abnormality. Possibly secondary to the patient's obesity and exercise intolerance  TTM  Obesity BMI 35. Will discuss diet, exercise, and weight loss at a later date

## 2024-05-29 ENCOUNTER — APPOINTMENT (OUTPATIENT)
Dept: CARDIOLOGY | Facility: CLINIC | Age: 34
End: 2024-05-29
Payer: MEDICAID

## 2024-05-29 DIAGNOSIS — R06.02 SHORTNESS OF BREATH: ICD-10-CM

## 2024-05-29 DIAGNOSIS — E66.9 OBESITY, UNSPECIFIED: ICD-10-CM

## 2024-05-29 DIAGNOSIS — R00.0 TACHYCARDIA, UNSPECIFIED: ICD-10-CM

## 2024-05-29 PROCEDURE — 99441: CPT
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
## 2024-05-29 NOTE — DISCUSSION/SUMMARY
[FreeTextEntry1] : Palpitations/tachycardia Sinus rhythm and APCs/PVCs demonstrated by Holter monitoring.   I recommended no medical therapy at this time; however, and [facility] cardiologist prescribed beta-blocker therapy   Dyspnea Not secondary to a cardiomyopathy or valvular abnormality. Possibly secondary to the patient's obesity and exercise intolerance We rediscussed diet, exercise, and weight loss.  Ms. XUAN FLOOD was instructed to follow-up in your office for continued blood pressure and cholesterol monitoring .  RTO as needed

## 2024-05-29 NOTE — CARDIOLOGY SUMMARY
[de-identified] : - Obesity: BMI 35 -ECHOCARDIOGRAM: 5/3/2024, outside office, reported EF 60%, trace mitral regurgitation and tricuspid regurgitation RVSP 10 -LE Doppler: Reported normal -EVENT MONITOR: 5/12/2024, [facility], 41 symptoms, 32 = SR, 5 = PVCs, 4 = APCs -CTA chest: Reported negative for PE

## 2024-12-05 ENCOUNTER — APPOINTMENT (OUTPATIENT)
Dept: NEUROLOGY | Facility: CLINIC | Age: 34
End: 2024-12-05
Payer: MEDICAID

## 2024-12-05 VITALS
HEIGHT: 65 IN | BODY MASS INDEX: 32.32 KG/M2 | WEIGHT: 194 LBS | SYSTOLIC BLOOD PRESSURE: 114 MMHG | DIASTOLIC BLOOD PRESSURE: 79 MMHG

## 2024-12-05 DIAGNOSIS — R56.9 UNSPECIFIED CONVULSIONS: ICD-10-CM

## 2024-12-05 DIAGNOSIS — Z78.9 OTHER SPECIFIED HEALTH STATUS: ICD-10-CM

## 2024-12-05 DIAGNOSIS — F12.91 CANNABIS USE, UNSPECIFIED, IN REMISSION: ICD-10-CM

## 2024-12-05 DIAGNOSIS — G44.89 OTHER HEADACHE SYNDROME: ICD-10-CM

## 2024-12-05 PROCEDURE — G2211 COMPLEX E/M VISIT ADD ON: CPT | Mod: NC

## 2024-12-05 PROCEDURE — 99215 OFFICE O/P EST HI 40 MIN: CPT

## 2024-12-09 ENCOUNTER — NON-APPOINTMENT (OUTPATIENT)
Age: 34
End: 2024-12-09

## 2024-12-09 ENCOUNTER — APPOINTMENT (OUTPATIENT)
Dept: NEUROLOGY | Facility: CLINIC | Age: 34
End: 2024-12-09
Payer: MEDICAID

## 2024-12-09 PROCEDURE — 95819 EEG AWAKE AND ASLEEP: CPT

## 2024-12-09 PROCEDURE — 93040 RHYTHM ECG WITH REPORT: CPT

## 2024-12-09 RX ORDER — LEVETIRACETAM 500 MG/1
500 TABLET, FILM COATED ORAL
Qty: 180 | Refills: 1 | Status: ACTIVE | COMMUNITY
Start: 1900-01-01 | End: 1900-01-01

## 2024-12-10 PROCEDURE — 95708 EEG WO VID EA 12-26HR UNMNTR: CPT

## 2024-12-10 PROCEDURE — 95700 EEG CONT REC W/VID EEG TECH: CPT

## 2024-12-10 PROCEDURE — 95721 EEG PHY/QHP>36<60 HR W/O VID: CPT

## 2024-12-31 ENCOUNTER — TRANSCRIPTION ENCOUNTER (OUTPATIENT)
Age: 34
End: 2024-12-31

## 2025-02-24 ENCOUNTER — APPOINTMENT (OUTPATIENT)
Dept: NEUROLOGY | Facility: CLINIC | Age: 35
End: 2025-02-24
Payer: MEDICAID

## 2025-02-24 VITALS
SYSTOLIC BLOOD PRESSURE: 141 MMHG | OXYGEN SATURATION: 99 % | HEIGHT: 65 IN | HEART RATE: 87 BPM | DIASTOLIC BLOOD PRESSURE: 87 MMHG

## 2025-02-24 VITALS — WEIGHT: 179 LBS | BODY MASS INDEX: 29.79 KG/M2

## 2025-02-24 DIAGNOSIS — R56.9 UNSPECIFIED CONVULSIONS: ICD-10-CM

## 2025-02-24 DIAGNOSIS — G40.109 LOCALIZATION-RELATED (FOCAL) (PARTIAL) SYMPTOMATIC EPILEPSY AND EPILEPTIC SYNDROMES WITH SIMPLE PARTIAL SEIZURES, NOT INTRACTABLE, W/OUT STATUS EPILEPTICUS: ICD-10-CM

## 2025-02-24 PROCEDURE — 99214 OFFICE O/P EST MOD 30 MIN: CPT

## 2025-02-24 RX ORDER — UREA 10 %
50 LOTION (ML) TOPICAL DAILY
Qty: 90 | Refills: 2 | Status: ACTIVE | COMMUNITY
Start: 2025-02-24 | End: 1900-01-01

## 2025-02-24 RX ORDER — LEVETIRACETAM 500 MG/1
500 TABLET, FILM COATED, EXTENDED RELEASE ORAL
Qty: 180 | Refills: 2 | Status: ACTIVE | COMMUNITY
Start: 2025-02-24 | End: 1900-01-01

## 2025-02-28 ENCOUNTER — APPOINTMENT (OUTPATIENT)
Dept: NEUROLOGY | Facility: CLINIC | Age: 35
End: 2025-02-28

## 2025-03-04 ENCOUNTER — APPOINTMENT (OUTPATIENT)
Dept: GASTROENTEROLOGY | Facility: CLINIC | Age: 35
End: 2025-03-04

## 2025-03-24 ENCOUNTER — APPOINTMENT (OUTPATIENT)
Dept: NEUROLOGY | Facility: CLINIC | Age: 35
End: 2025-03-24
Payer: MEDICAID

## 2025-03-24 VITALS
BODY MASS INDEX: 30.16 KG/M2 | OXYGEN SATURATION: 99 % | HEIGHT: 65 IN | HEART RATE: 101 BPM | SYSTOLIC BLOOD PRESSURE: 116 MMHG | WEIGHT: 181 LBS | DIASTOLIC BLOOD PRESSURE: 72 MMHG

## 2025-03-24 DIAGNOSIS — G40.109 LOCALIZATION-RELATED (FOCAL) (PARTIAL) SYMPTOMATIC EPILEPSY AND EPILEPTIC SYNDROMES WITH SIMPLE PARTIAL SEIZURES, NOT INTRACTABLE, W/OUT STATUS EPILEPTICUS: ICD-10-CM

## 2025-03-24 PROCEDURE — G2211 COMPLEX E/M VISIT ADD ON: CPT | Mod: NC

## 2025-03-24 PROCEDURE — 99213 OFFICE O/P EST LOW 20 MIN: CPT

## 2025-04-30 ENCOUNTER — NON-APPOINTMENT (OUTPATIENT)
Age: 35
End: 2025-04-30

## 2025-05-22 ENCOUNTER — APPOINTMENT (OUTPATIENT)
Dept: ORTHOPEDIC SURGERY | Facility: CLINIC | Age: 35
End: 2025-05-22
Payer: MEDICAID

## 2025-05-22 ENCOUNTER — NON-APPOINTMENT (OUTPATIENT)
Age: 35
End: 2025-05-22

## 2025-05-22 VITALS — WEIGHT: 177 LBS | BODY MASS INDEX: 29.49 KG/M2 | HEIGHT: 65 IN

## 2025-05-22 DIAGNOSIS — S93.401A SPRAIN OF UNSPECIFIED LIGAMENT OF RIGHT ANKLE, INITIAL ENCOUNTER: ICD-10-CM

## 2025-05-22 DIAGNOSIS — M25.571 PAIN IN RIGHT ANKLE AND JOINTS OF RIGHT FOOT: ICD-10-CM

## 2025-05-22 PROCEDURE — 99203 OFFICE O/P NEW LOW 30 MIN: CPT

## 2025-05-29 ENCOUNTER — APPOINTMENT (OUTPATIENT)
Dept: MRI IMAGING | Facility: CLINIC | Age: 35
End: 2025-05-29

## 2025-05-29 ENCOUNTER — OUTPATIENT (OUTPATIENT)
Dept: OUTPATIENT SERVICES | Facility: HOSPITAL | Age: 35
LOS: 1 days | End: 2025-05-29
Payer: MEDICAID

## 2025-05-29 ENCOUNTER — APPOINTMENT (OUTPATIENT)
Dept: ORTHOPEDIC SURGERY | Facility: CLINIC | Age: 35
End: 2025-05-29
Payer: MEDICAID

## 2025-05-29 DIAGNOSIS — S73.192A OTHER SPRAIN OF LEFT HIP, INITIAL ENCOUNTER: ICD-10-CM

## 2025-05-29 DIAGNOSIS — Z92.89 PERSONAL HISTORY OF OTHER MEDICAL TREATMENT: Chronic | ICD-10-CM

## 2025-05-29 DIAGNOSIS — M25.571 PAIN IN RIGHT ANKLE AND JOINTS OF RIGHT FOOT: ICD-10-CM

## 2025-05-29 DIAGNOSIS — K08.409 PARTIAL LOSS OF TEETH, UNSPECIFIED CAUSE, UNSPECIFIED CLASS: Chronic | ICD-10-CM

## 2025-05-29 DIAGNOSIS — M25.852 OTHER SPECIFIED JOINT DISORDERS, LEFT HIP: ICD-10-CM

## 2025-05-29 PROCEDURE — 99214 OFFICE O/P EST MOD 30 MIN: CPT

## 2025-05-29 PROCEDURE — 73721 MRI JNT OF LWR EXTRE W/O DYE: CPT | Mod: 26,RT

## 2025-05-29 PROCEDURE — 73721 MRI JNT OF LWR EXTRE W/O DYE: CPT

## 2025-05-30 ENCOUNTER — NON-APPOINTMENT (OUTPATIENT)
Age: 35
End: 2025-05-30

## 2025-06-11 ENCOUNTER — APPOINTMENT (OUTPATIENT)
Dept: ORTHOPEDIC SURGERY | Facility: CLINIC | Age: 35
End: 2025-06-11
Payer: MEDICAID

## 2025-06-11 PROBLEM — S93.411A SPRAIN OF CALCANEOFIBULAR LIGAMENT OF RIGHT ANKLE: Status: ACTIVE | Noted: 2025-06-11

## 2025-06-11 PROCEDURE — 99446 NTRPROF PH1/NTRNET/EHR 5-10: CPT

## 2025-06-23 ENCOUNTER — APPOINTMENT (OUTPATIENT)
Dept: NEUROLOGY | Facility: CLINIC | Age: 35
End: 2025-06-23

## 2025-07-28 NOTE — ASU PATIENT PROFILE, ADULT - MEDICATION ADMINISTRATION INFO, PROFILE
Return to the ED if you have any new or changing symptoms such as abdominal pain, fever, lightheadedness, feeling like you are in a pass out, worsening vaginal bleeding, or you have any other concerns.    We will call you in 48 hours if you were to have a positive result on pending tests.  
no concerns